# Patient Record
Sex: FEMALE | Race: WHITE | NOT HISPANIC OR LATINO | Employment: UNEMPLOYED | URBAN - METROPOLITAN AREA
[De-identification: names, ages, dates, MRNs, and addresses within clinical notes are randomized per-mention and may not be internally consistent; named-entity substitution may affect disease eponyms.]

---

## 2018-04-11 ENCOUNTER — OFFICE VISIT (OUTPATIENT)
Dept: INTERNAL MEDICINE | Facility: MEDICAL CENTER | Age: 33
End: 2018-04-11
Payer: MEDICAID

## 2018-04-11 VITALS
DIASTOLIC BLOOD PRESSURE: 60 MMHG | HEIGHT: 71 IN | SYSTOLIC BLOOD PRESSURE: 120 MMHG | HEART RATE: 76 BPM | BODY MASS INDEX: 29.99 KG/M2 | WEIGHT: 214.2 LBS | OXYGEN SATURATION: 94 % | TEMPERATURE: 97.9 F

## 2018-04-11 DIAGNOSIS — F34.1 DYSTHYMIA: ICD-10-CM

## 2018-04-11 DIAGNOSIS — R21 RASH: ICD-10-CM

## 2018-04-11 DIAGNOSIS — F90.9 ATTENTION DEFICIT HYPERACTIVITY DISORDER (ADHD), UNSPECIFIED ADHD TYPE: ICD-10-CM

## 2018-04-11 DIAGNOSIS — H61.20 IMPACTED CERUMEN, UNSPECIFIED LATERALITY: ICD-10-CM

## 2018-04-11 DIAGNOSIS — E78.5 DYSLIPIDEMIA: ICD-10-CM

## 2018-04-11 PROCEDURE — 99204 OFFICE O/P NEW MOD 45 MIN: CPT | Mod: GC | Performed by: INTERNAL MEDICINE

## 2018-04-11 ASSESSMENT — PATIENT HEALTH QUESTIONNAIRE - PHQ9
CLINICAL INTERPRETATION OF PHQ2 SCORE: 2
SUM OF ALL RESPONSES TO PHQ QUESTIONS 1-9: 9
5. POOR APPETITE OR OVEREATING: 1 - SEVERAL DAYS

## 2018-04-11 NOTE — PROGRESS NOTES
New Patient to Establish    Reason to establish: New patient to establish    CC:     Dysthymia  ADHD  Autism spectrum disorder  Dyslipidemia      HPI:   33 year old presented to establish care. Reports being diagnosed with ADHD and autism spectrum disorder at the age of 29 in [place]. Is currently requesting a referral to psychology and psychiatry for the same reason. She was also diagnosed with depression/dysthymia and currently reports low mood, sleep difficulties, difficulties with concentration, loss of interest, guilt. Denies any SI or HI. Reports a history of self-harm but has never made a plan to kill herself or others.  Patient also came in with a topical clotrimazole ointment which he applies around her nipple areas for a reported painful erythematous rash which has been persistent. She reports relief only with clotrimazole and other agents such as topical emollients and Vaseline to not help the condition. Denies any bleeding or discharge currently. She reports that her rash is well controlled currently. She is running out of her prescription and requesting a refill on the ointment.    Patient Active Problem List    Diagnosis Date Noted   • Dysthymia 04/11/2018   • ADHD 04/11/2018   • Dyslipidemia 04/11/2018   • Excess ear wax 04/11/2018       Past medical history:  History of dyslipidemia, dysthymia, ADHD, autism spectrum disorder.    Current Outpatient Prescriptions   Medication Sig Dispense Refill   • carbamide peroxide (CARBAMOXIDE EAR DROPS) 6.5 % Solution Place 6 Drops in ear 2 times a day. Administer drops in both ears. 1 Bottle 3     No current facility-administered medications for this visit.        Allergies as of 04/11/2018   • (Not on File)       Social History     Social History   • Marital status: Unknown     Spouse name: N/A   • Number of children: N/A   • Years of education: N/A     Occupational History   • Not on file.     Social History Main Topics   • Smoking status: Never Smoker   •  "Smokeless tobacco: Never Used   • Alcohol use Yes      Comment: rarely   • Drug use: No   • Sexual activity: Not on file     Other Topics Concern   • Not on file     Social History Narrative   • No narrative on file     Family history: History of depression in her aunt. History of hypertension in her parents.    History reviewed. No pertinent surgical history.    ROS: As per HPI. Additional pertinent symptoms as noted below.    Constitutional: denies unintentional weight loss or excessive fatigue  Eyes: denies blurry vision  ENT: reports sensation of buildup of earwax. Denies any difficulty hearing  Cardiovascular: denies any chest pain, palpitations, orthopnea  Respiratory: denies any cough, phlegm production, fevers  GI: denies any chronic constipation or diarrhea  : denies any dysuria  Musculo-skeletal: denies any pedal edema  Skin: as per history of present illness.  Neurological: denies any weakness or sensory disturbances  Psychological: as per history of present illness.  Other: not applicable  All others negative    /60   Pulse 76   Temp 36.6 °C (97.9 °F)   Ht 1.791 m (5' 10.5\")   Wt 97.2 kg (214 lb 3.2 oz)   SpO2 94%   BMI 30.30 kg/m²     Physical Exam  General:  Alert and oriented, No apparent distress.    Breast : Normal-appearing periareolar left skin. No active erythema, rash, discharge, tenderness noted.    Ears: Cerumen noted obstructing 25% of the lumen. Tympanic membranes can be visualized bilaterally.    Eyes: Pupils equal and reactive. No scleral icterus.    Throat: Clear no erythema or exudates noted.    Neck: Supple. No lymphadenopathy noted. Thyroid not enlarged.    Lungs: Clear to auscultation and percussion bilaterally.    Cardiovascular: Regular rate and rhythm. No murmurs, rubs or gallops.    Abdomen:  Benign. No rebound or guarding noted.    Extremities: No clubbing, cyanosis, edema.    Skin: Clear. No rash or suspicious skin lesions noted.      Note: I have reviewed all " pertinent labs and diagnostic tests associated with this visit with specific comments listed under the assessment and plan below    Assessment and Plan    1. Dysthymia  -Referred to behavioral health. Patient is requesting psychiatry assessment for medication management related to this disease.  -B12, folate, TSH      2. Attention deficit hyperactivity disorder (ADHD), unspecified ADHD type  -Referral to behavioral health.   -Patient is requesting psychology referral for ADHD    3. Dyslipidemia  - Patient reports a history of this.   - Lipid panel ordered    4. Impacted cerumen, unspecified laterality  -Patient recommended to apply Carbamide peroxide  - ENT referral    5. Rash  - self reported per-areolar rash. Patient was prescribed clotrimazole ointment however is currently breast-feeding  -Clotrimazole use is not studied in human subject who are pregnant or breastfeeding.   - recommend stopping use until further evaluation and counseling through dermatology  - referral to Dermatology given  - Okay to apply vaseline.       Followup: 5 weeks  Risk Assessment (discuss potential complications a function of chronic problems): N/A    Complexity (discuss number of co-morbidities): No complex    Signed by: Zacarias Calero M.D.

## 2018-04-16 DIAGNOSIS — E78.5 DYSLIPIDEMIA: ICD-10-CM

## 2018-04-16 DIAGNOSIS — F90.9 ATTENTION DEFICIT HYPERACTIVITY DISORDER (ADHD), UNSPECIFIED ADHD TYPE: ICD-10-CM

## 2018-04-16 DIAGNOSIS — F34.1 DYSTHYMIA: ICD-10-CM

## 2018-05-16 ENCOUNTER — OFFICE VISIT (OUTPATIENT)
Dept: INTERNAL MEDICINE | Facility: MEDICAL CENTER | Age: 33
End: 2018-05-16
Payer: MEDICAID

## 2018-05-16 VITALS
DIASTOLIC BLOOD PRESSURE: 62 MMHG | OXYGEN SATURATION: 96 % | HEART RATE: 80 BPM | HEIGHT: 71 IN | TEMPERATURE: 97 F | SYSTOLIC BLOOD PRESSURE: 110 MMHG | WEIGHT: 210.4 LBS | BODY MASS INDEX: 29.46 KG/M2

## 2018-05-16 DIAGNOSIS — H61.20 IMPACTED CERUMEN, UNSPECIFIED LATERALITY: ICD-10-CM

## 2018-05-16 DIAGNOSIS — R31.29 MICROHEMATURIA: ICD-10-CM

## 2018-05-16 DIAGNOSIS — F34.1 DYSTHYMIA: ICD-10-CM

## 2018-05-16 DIAGNOSIS — F90.9 ATTENTION DEFICIT HYPERACTIVITY DISORDER (ADHD), UNSPECIFIED ADHD TYPE: ICD-10-CM

## 2018-05-16 DIAGNOSIS — E78.5 DYSLIPIDEMIA: ICD-10-CM

## 2018-05-16 DIAGNOSIS — F84.0 AUTISM SPECTRUM DISORDER: ICD-10-CM

## 2018-05-16 PROCEDURE — 99214 OFFICE O/P EST MOD 30 MIN: CPT | Mod: GC | Performed by: INTERNAL MEDICINE

## 2018-05-16 RX ORDER — ASPIRIN 81 MG
TABLET, DELAYED RELEASE (ENTERIC COATED) ORAL
COMMUNITY
Start: 2018-04-11 | End: 2018-07-25

## 2018-05-16 NOTE — PROGRESS NOTES
Established Patient    Shakira presents today with the following:    CC: Follow-up for dysthymia, dyslipidemia, history of microhematuria    HPI: 33-year-old female with a past medical history of ADHD, autism spectrum disorder, dyslipidemia presented for follow-up visit to review referrals and labs. 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 She has problems with low mood, sleep difficulties, difficulty in concentration, loss of interest, and constant irritable mood. She denies any SI or HI. She does have a history of self-harm but denies any attempts. Her B12 and TSH are within normal limits. She is getting psychotherapy through psychology. And has upcoming psychiatry appointment to discuss potential medications for therapy.    Patient continues to apply topical clotrimazole ointment around her nipple area for painful erythematous rash after breast-feeding with resultant discharge. She was advised to stop as clotrimazole is partially absorbed and secreted in breast milk. The case was discussed with Dr. Rosenthal, dermatology, who recommends stopping clotrimazole in applying topical emollients along with penaten, homeopathic ointment aound the nipple area and wiping off before breast feeding.     Patient also reports a history of microhematuria which was diagnosed around a year ago. Workup including a CT scan was negative for any pathology. She reports having seen urology for the same reason. Currently denies any pink urine or gross hematuria. Denies any prior history of kidney stones, bladder stones, or masses.    Patient Active Problem List    Diagnosis Date Noted   • Microhematuria 05/16/2018   • Dysthymia 04/11/2018   • ADHD 04/11/2018   • Dyslipidemia 04/11/2018   • Excess ear wax 04/11/2018       Current Outpatient Prescriptions   Medication Sig Dispense Refill   • carbamide peroxide (CARBAMOXIDE EAR DROPS) 6.5  "% Solution Place 6 Drops in ear 2 times a day. Administer drops in both ears. 1 Bottle 3   • DEBROX 6.5 % Solution        No current facility-administered medications for this visit.        ROS: As per HPI. Additional pertinent symptoms as noted below.    Constitutional: denies any unintentional weight loss or excessive fatigue  Cardiovascular: denies any chest palpitations  Respiratory: denies any cough or phlegm production  Musculo-skeletal: denies any pedal edema  Psychological: as per history of present illness  All others negative    History reviewed. No pertinent family history.  Social History   Substance Use Topics   • Smoking status: Never Smoker   • Smokeless tobacco: Never Used   • Alcohol use Yes      Comment: rarely     History reviewed. No pertinent surgical history.  Allergies: Patient has no allergy information on record.    /62   Pulse 80   Temp 36.1 °C (97 °F)   Ht 1.791 m (5' 10.5\")   Wt 95.4 kg (210 lb 6.4 oz)   SpO2 96%   BMI 29.76 kg/m²     Physical Exam   Constitutional:  oriented to person, place, and time. No distress.   Eyes: Pupils are equal, round, and reactive to light. No scleral icterus.  Neck: Neck supple. No thyromegaly present.   Cardiovascular: Normal rate, regular rhythm and normal heart sounds.  Exam reveals no gallop and no friction rub.  No murmur heard.  Pulmonary/Chest: Breath sounds normal. Chest wall is not dull to percussion.   Musculoskeletal:   no edema.   Lymphadenopathy: no cervical adenopathy  Neurological: alert and oriented to person, place, and time.   Skin: No cyanosis. Nails show no clubbing.        Assessment and Plan    1. Microhematuria  - reports a history of this and getting CT imaging which was negative 2 years ago.   - no records available.  - Was recommended yearly UA for monitoring. Ordered.        2. Dysthymia  -Referred to behavioral health is currently receiving psychotherapy. She is also scheduled to have psychiatry to initial evaluation " for depression on 5/17/18.  -Reports stable low mood symptoms. Please see history of present illness for more details  -Denies any SI or HI  -Patient is concerned about starting new medications as she is currently breastfeeding.   -she does have a history of self harm.      2. Attention deficit hyperactivity disorder (ADHD), unspecified ADHD type      Autism spectrum disorder  Patient is concerned about having an organic cause of this as she was diagnosed in late 20s. She requests to have a SPECT scan of the brain for these concerns.  - Denies a prior history of TBI, mass, seizures, skull abnormalities, encephalitis, meningitis.   - Recommend psychiatric evaluation to determine whether this test would be required.        3. Dyslipidemia  - Patient reports a history of this.   - Lipid panel shows TC of 166 and LDL high at 101.   - Patient educated importance of good LDL control and importance of CV risk control.   - Recommended lifestyle modification including increased exercise, diet control, low cholesterol diet, and weight loss.   - Repeat lipid panel in 5/2019     4. Impacted cerumen, unspecified laterality  -Patient recommended to apply Carbamide peroxide  - ENT referral pending     5. Rash  - self reported per-areolar rash. Patient was prescribed clotrimazole ointment however is currently breast-feeding  -Clotrimazole use is not studied in human subject who are pregnant or breastfeeding. Class C pregnancyu and is excreted in breast milk.   - Dr. Rosenthal recommend stopping use and applying Pentamen cream.   - referral to Dermatology given pending.   - Okay to apply vaseline.        TDAP: Will address on next visit  COLONOSCOPY: Not indicated  FLU SHOT: We'll administer at next visit  PPV/PCV:  Not indicated  MAMMOGRAM: Not indicated  Pap smear: Will address at next visit.         Return in about 3 months (around 8/16/2018).   Signed by: Zacarias Calero M.D.

## 2018-05-16 NOTE — PATIENT INSTRUCTIONS
Dyslipidemia  Dyslipidemia is an imbalance of waxy, fat-like substances (lipids) in the blood. The body needs lipids in small amounts. Dyslipidemia often involves a high level of cholesterol or triglycerides, which are types of lipids.  Common forms of dyslipidemia include:  · High levels of bad cholesterol (LDL cholesterol). LDL is the type of cholesterol that causes fatty deposits (plaques) to build up in the blood vessels that carry blood away from your heart (arteries).  · Low levels of good cholesterol (HDL cholesterol). HDL cholesterol is the type of cholesterol that protects against heart disease. High levels of HDL remove the LDL buildup from arteries.  · High levels of triglycerides. Triglycerides are a fatty substance in the blood that is linked to a buildup of plaques in the arteries.  You can develop dyslipidemia because of the genes you are born with (primary dyslipidemia) or changes that occur during your life (secondary dyslipidemia), or as a side effect of certain medical treatments.  What are the causes?  Primary dyslipidemia is caused by changes (mutations) in genes that are passed down through families (inherited). These mutations cause several types of dyslipidemia. Mutations can result in disorders that make the body produce too much LDL cholesterol or triglycerides, or not enough HDL cholesterol. These disorders may lead to heart disease, arterial disease, or stroke at an early age.  Causes of secondary dyslipidemia include certain lifestyle choices and diseases that lead to dyslipidemia, such as:  · Eating a diet that is high in animal fat.  · Not getting enough activity or exercise (having a sedentary lifestyle).  · Having diabetes, kidney disease, liver disease, or thyroid disease.  · Drinking large amounts of alcohol.  · Using certain types of drugs.  What increases the risk?  You may be at greater risk for dyslipidemia if you are an older man or if you are a woman who has gone through  menopause. Other risk factors include:  · Having a family history of dyslipidemia.  · Taking certain medicines, including birth control pills, steroids, some diuretics, beta-blockers, and some medicines for HIV.  · Smoking cigarettes.  · Eating a high-fat diet.  · Drinking large amounts of alcohol.  · Having certain medical conditions such as diabetes, polycystic ovary syndrome (PCOS), pregnancy, kidney disease, liver disease, or hypothyroidism.  · Not exercising regularly.  · Being overweight or obese with too much belly fat.  What are the signs or symptoms?  Dyslipidemia does not usually cause any symptoms.  Very high lipid levels can cause fatty bumps under the skin (xanthomas) or a white or gray ring around the black center (pupil) of the eye. Very high triglyceride levels can cause inflammation of the pancreas (pancreatitis).  How is this diagnosed?  Your health care provider may diagnose dyslipidemia based on a routine blood test (fasting blood test). Because most people do not have symptoms of the condition, this blood testing (lipid profile) is done on adults age 20 and older and is repeated every 5 years. This test checks:  · Total cholesterol. This is a measure of the total amount of cholesterol in your blood, including LDL cholesterol, HDL cholesterol, and triglycerides. A healthy number is below 200.  · LDL cholesterol. The target number for LDL cholesterol is different for each person, depending on individual risk factors. For most people, a number below 100 is healthy. Ask your health care provider what your LDL cholesterol number should be.  · HDL cholesterol. An HDL level of 60 or higher is best because it helps to protect against heart disease. A number below 40 for men or below 50 for women increases the risk for heart disease.  · Triglycerides. A healthy triglyceride number is below 150.  If your lipid profile is abnormal, your health care provider may do other blood tests to get more information  about your condition.  How is this treated?  Treatment depends on the type of dyslipidemia that you have and your other risk factors for heart disease and stroke. Your health care provider will have a target range for your lipid levels based on this information.  For many people, treatment starts with lifestyle changes, such as diet and exercise. Your health care provider may recommend that you:  · Get regular exercise.  · Make changes to your diet.  · Quit smoking if you smoke.  If diet changes and exercise do not help you reach your goals, your health care provider may also prescribe medicine to lower lipids. The most commonly prescribed type of medicine lowers your LDL cholesterol (statin drug). If you have a high triglyceride level, your provider may prescribe another type of drug (fibrate) or an omega-3 fish oil supplement, or both.  Follow these instructions at home:  · Take over-the-counter and prescription medicines only as told by your health care provider. This includes supplements.  · Get regular exercise. Start an aerobic exercise and strength training program as told by your health care provider. Ask your health care provider what activities are safe for you. Your health care provider may recommend:  ¨ 30 minutes of aerobic activity 4-6 days a week. Brisk walking is an example of aerobic activity.  ¨ Strength training 2 days a week.  · Eat a healthy diet as told by your health care provider. This can help you reach and maintain a healthy weight, lower your LDL cholesterol, and raise your HDL cholesterol. It may help to work with a diet and nutrition specialist (dietitian) to make a plan that is right for you. Your dietitian or health care provider may recommend:  ¨ Limiting your calories, if you are overweight.  ¨ Eating more fruits, vegetables, whole grains, fish, and lean meats.  ¨ Limiting saturated fat, trans fat, and cholesterol.  · Follow instructions from your health care provider or dietitian  about eating or drinking restrictions.  · Limit alcohol intake to no more than one drink per day for nonpregnant women and two drinks per day for men. One drink equals 12 oz of beer, 5 oz of wine, or 1½ oz of hard liquor.  · Do not use any products that contain nicotine or tobacco, such as cigarettes and e-cigarettes. If you need help quitting, ask your health care provider.  · Keep all follow-up visits as told by your health care provider. This is important.  Contact a health care provider if:  · You are having trouble sticking to your exercise or diet plan.  · You are struggling to quit smoking or control your use of alcohol.  Summary  · Dyslipidemia is an imbalance of waxy, fat-like substances (lipids) in the blood. The body needs lipids in small amounts. Dyslipidemia often involves a high level of cholesterol or triglycerides, which are types of lipids.  · Treatment depends on the type of dyslipidemia that you have and your other risk factors for heart disease and stroke.  · For many people, treatment starts with lifestyle changes, such as diet and exercise. Your health care provider may also prescribe medicine to lower lipids.  This information is not intended to replace advice given to you by your health care provider. Make sure you discuss any questions you have with your health care provider.  Document Released: 12/23/2014 Document Revised: 08/14/2017 Document Reviewed: 08/14/2017  [employer] Interactive Patient Education © 2017 [employer]

## 2018-05-31 DIAGNOSIS — R31.29 MICROHEMATURIA: ICD-10-CM

## 2018-07-10 ENCOUNTER — OFFICE VISIT (OUTPATIENT)
Dept: INTERNAL MEDICINE | Facility: MEDICAL CENTER | Age: 33
End: 2018-07-10
Payer: MEDICAID

## 2018-07-10 VITALS — WEIGHT: 216.2 LBS | HEIGHT: 71 IN | BODY MASS INDEX: 30.27 KG/M2

## 2018-07-10 DIAGNOSIS — O92.29 PAIN OF BREAST DURING BREASTFEEDING: ICD-10-CM

## 2018-07-10 DIAGNOSIS — R21 RASH: ICD-10-CM

## 2018-07-10 PROBLEM — R20.9 DISTURBANCE OF SKIN SENSATION: Status: ACTIVE | Noted: 2018-07-10

## 2018-07-10 PROCEDURE — 99202 OFFICE O/P NEW SF 15 MIN: CPT | Performed by: DERMATOLOGY

## 2018-07-10 RX ORDER — CLOTRIMAZOLE 1 %
CREAM (GRAM) TOPICAL
Qty: 1 TUBE | Refills: 0 | Status: SHIPPED | OUTPATIENT
Start: 2018-07-10 | End: 2019-06-05

## 2018-07-10 NOTE — PATIENT INSTRUCTIONS
Can try vaseline or aquaphor to see if moisturizer helps.  Can try to air out breasts by being home with no shirt.   Can try breast milk on nipple after nursing  Can try changing position when nursing

## 2018-07-10 NOTE — PROGRESS NOTES
"Shakira Pete  1985  [id number]    Consultation             Vitals:    07/10/18 1100   Weight: 98.1 kg (216 lb 3.2 oz)   Height: 1.791 m (5' 10.5\")       Chief Complaint   Patient presents with   • Rash     Breast      ___________________________________________________________________            History of Present Illness (HPI) c/o rash on breasts x several months.   Using clotrimazole with sometimes help. Similar rash when breast feeding   About 2 years ago. Now with 17 month old. Still nursing.         Dr. Calero has referred for a consultation to evaluate rash    Current Outpatient Prescriptions on File Prior to Visit   Medication Sig Dispense Refill   • DEBROX 6.5 % Solution      • carbamide peroxide (CARBAMOXIDE EAR DROPS) 6.5 % Solution Place 6 Drops in ear 2 times a day. Administer drops in both ears. (Patient not taking: Reported on 7/10/2018) 1 Bottle 3     No current facility-administered medications on file prior to visit.      Patient has no allergy information on record.      Review of Systems:        General: Denies fever      Hematologic: no excessive bleeding problems              History reviewed. No pertinent past medical history.  Skin Cancer no h/o    Social History   Substance Use Topics   • Smoking status: Never Smoker   • Smokeless tobacco: Never Used   • Alcohol use Yes      Comment: rarely     Sunscreen Use:No     History reviewed. No pertinent surgical history.        History reviewed. No pertinent family history.      Physical Exam:   Constitutional: Well nourished, NAD, pleasant  alert and cooperative; normal mood and affect; normal attention span and concentration.    Skin face exam done: no suspicious lesions on eyelids, lips, face, ears, except as noted.  Face few brown macs  R areola with mild inflammation, L areola with no inflammation                        Assessment and Plan:   1. Pain of breast during breastfeeding  Explained baby is preferring to latch on R side so " more soreness of R side.   Can try vaseline or aquaphor to see if moisturizer helps.  Can try to air out breasts by being home with no shirt.   Can try breast milk on nipple after nursing  Can try changing position when nursing  Refilled clotrimazole. Pt to wipe off before nursing.     2. Rash  Explained due to irritation from nursing.       Hue Rosenthal M.D.   Return if symptoms worsen or fail to improve.

## 2018-07-25 ENCOUNTER — OFFICE VISIT (OUTPATIENT)
Dept: INTERNAL MEDICINE | Facility: MEDICAL CENTER | Age: 33
End: 2018-07-25
Payer: MEDICAID

## 2018-07-25 VITALS
DIASTOLIC BLOOD PRESSURE: 68 MMHG | SYSTOLIC BLOOD PRESSURE: 99 MMHG | WEIGHT: 215 LBS | RESPIRATION RATE: 18 BRPM | BODY MASS INDEX: 30.1 KG/M2 | HEART RATE: 61 BPM | HEIGHT: 71 IN | TEMPERATURE: 96.7 F | OXYGEN SATURATION: 96 %

## 2018-07-25 DIAGNOSIS — R21 RASH: ICD-10-CM

## 2018-07-25 DIAGNOSIS — F34.1 DYSTHYMIA: ICD-10-CM

## 2018-07-25 DIAGNOSIS — E78.5 DYSLIPIDEMIA: ICD-10-CM

## 2018-07-25 DIAGNOSIS — R31.29 MICROHEMATURIA: ICD-10-CM

## 2018-07-25 PROCEDURE — 99214 OFFICE O/P EST MOD 30 MIN: CPT | Mod: GC | Performed by: INTERNAL MEDICINE

## 2018-07-25 NOTE — PROGRESS NOTES
Established Patient    Shakira presents today with the following:    CC:   Microhematuria        HPI:   33-year-old with a past medical history of ADHD, autism spectrum disorder, dyslipidemia presented for follow-up visit to review labs. She had a urinalysis performed recently for history of hematuria. Currently denies any obvious hematuria, cloudy urine, flank pain, suprapubic tenderness, cloudy urine, followed her, or dysuria.    Patient was evaluated by dermatology who recommends continuing conservative therapies for her painful erythematous rash around her right nipple area. She continues to use clotrimazole as needed.    She was evaluated by mental health and was recommended neuropsychiatry testing. Her insurance does not cover this therefore, she was referred to psychiatry. Currently denies any SI/HI. 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    Patient Active Problem List    Diagnosis Date Noted   • Disturbance of skin sensation 07/10/2018   • Pain of breast during breastfeeding 07/10/2018   • Rash 07/10/2018   • Microhematuria 05/16/2018   • Autism spectrum disorder 05/16/2018   • Dysthymia 04/11/2018   • ADHD 04/11/2018   • Dyslipidemia 04/11/2018   • Excess ear wax 04/11/2018       Current Outpatient Prescriptions   Medication Sig Dispense Refill   • clotrimazole (LOTRIMIN) 1 % Cream Apply to affected area twice a day, antifungal 1 Tube 0     No current facility-administered medications for this visit.        ROS: As per HPI. Additional pertinent symptoms as noted below.  Constitutional: denies any unintentional weight loss or excessive fatigue  Cardiovascular: denies any chest palpitations  Respiratory: denies any cough or phlegm production  Musculo-skeletal: denies any pedal edema  Psychological: as per history of present illness  All others negative    No family history on file.  Social History   Substance Use Topics   • Smoking status: Never Smoker   • Smokeless tobacco:  "Never Used   • Alcohol use Yes      Comment: rarely     No past surgical history on file.  Allergies: Patient has no known allergies.    BP (!) 99/68   Pulse 61   Temp 35.9 °C (96.7 °F)   Resp 18   Ht 1.791 m (5' 10.5\")   Wt 97.5 kg (215 lb)   SpO2 96%   BMI 30.41 kg/m²     Physical Exam   Constitutional:  oriented to person, place, and time. No distress.   Eyes: Pupils are equal, round, and reactive to light. No scleral icterus.  Neck: Neck supple. No thyromegaly present.   Cardiovascular: Normal rate, regular rhythm and normal heart sounds.  Exam reveals no gallop and no friction rub.  No murmur heard.  Pulmonary/Chest: Breath sounds normal. Chest wall is not dull to percussion.   Musculoskeletal:   no edema.   Lymphadenopathy: no cervical adenopathy  Neurological: alert and oriented to person, place, and time.   Skin: No cyanosis. Nails show no clubbing.      Assessment and Plan    1. Microhematuria  - reports a history of this and getting CT imaging which was negative 2 years ago.   - no records available.  - UA shows occult blood +1+ without any RBCs.  -Repeat UA.    2. Rash  - self reported per-areolar rash. .   - Dr. Rosenthal recommend emollients.   - Okay to apply vaseline.   - prn clotrimazole    3. Dysthymia  -Was evaluated by mental health. Psychiatry referral placed in pending.  -Patient stopped sertraline use while breast-feeding. She does not want to use any SSRIs while breast-feeding.    4. Dyslipidemia  - Patient reports a history of this.   - Lipid panel shows TC of 166 and LDL high at 101.   - Patient educated importance of good LDL control and importance of CV risk control.   - Recommended lifestyle modification including increased exercise, diet control, low cholesterol diet, and weight loss.   - Repeat lipid panel in 5/2019    Return in about 6 months (around 1/25/2019).   Signed by: Zacarias Calero M.D.  "

## 2018-08-01 DIAGNOSIS — R31.29 MICROHEMATURIA: ICD-10-CM

## 2018-09-13 ENCOUNTER — TELEPHONE (OUTPATIENT)
Dept: INTERNAL MEDICINE | Facility: MEDICAL CENTER | Age: 33
End: 2018-09-13

## 2018-09-13 DIAGNOSIS — R41.89 AKINETIC MUTISM: ICD-10-CM

## 2018-09-13 NOTE — TELEPHONE ENCOUNTER
1. Caller Name: Tiffanie (NONAN RN)                      Call Back Number: [phone]    2. Message: Tiffanie called stating she is working with a patient of our to get a referral processed for her.    An hour later of receiving message from Tiffanie received a message from patient stating she needs a referral for neuropsychology and has a  working on this as well and would like a call back.    3. Patient approves office to leave a detailed voicemail/MyChart message: N\A    I called patient back today but was unable to speak with her. Will attempt to call tomorrow

## 2018-09-18 NOTE — TELEPHONE ENCOUNTER
Called Tiffanie after speaking with Shakira stating I need a little more information and diagnosis as ot why this referral is being made. I left a message for her to return my call

## 2018-09-20 NOTE — TELEPHONE ENCOUNTER
Got a call back from Tiffanie and spoke with her. She let me know patient would need a referral for neuropysch due to Dr. Beltran not being able to help this patient any more. She let me know that referral would need to be processed with Dx: R41.89 to Dr. Elina Cuadra P: [phone] F: [phone] Attn :Maria Del Carmen. On the universal auth form it would need to be authorized with CPT 96118 x 13 units. Auth form attn: Tiffanie RAWLS case manager. Patient needs this processed asap as patient got appt scheduled on 10/18 at 8:45 AM with travel arranged by medicaid

## 2018-09-24 NOTE — TELEPHONE ENCOUNTER
Zacarias Calero M.D.   Gregoria Brannon, Med Ass't Yesterday (3:25 PM)      Referral sent with details outlined (Routing comment

## 2018-10-03 ENCOUNTER — TELEPHONE (OUTPATIENT)
Dept: INTERNAL MEDICINE | Facility: MEDICAL CENTER | Age: 33
End: 2018-10-03

## 2018-10-03 DIAGNOSIS — F84.0 AUTISM SPECTRUM DISORDER: ICD-10-CM

## 2018-10-03 DIAGNOSIS — F90.9 ATTENTION DEFICIT HYPERACTIVITY DISORDER (ADHD), UNSPECIFIED ADHD TYPE: ICD-10-CM

## 2018-10-03 NOTE — TELEPHONE ENCOUNTER
----- Message from Dave Godoy sent at 10/3/2018 11:28 AM PDT -----  Karlie Kinney,    I Called Tiffanie (RN Case Manager [phone]) to get some clarification on this referral. Per Tiffanie, this referral is suppose to be for neuropsychology, not psychiatry. The patient has been scheduled with a neuropsychologist in [place], Dr. Elina Cuadra, and their office needs the order and medical records faxed to them. Also, [facility] requires that I fax this information to them to fax number [phone]. I will request a new referral to psychology and fax all appropriate information to the designated offices.       Could you have Dr. Calero place a referral similar to this one, but for psychology instead of psychiatry. I will take care of the rest after that.    Thank you  Dave  [facility]  ----- Message -----  From: Gregoria Brannon Med Ass't  Sent: 10/3/2018  11:03 AM  To: Dave Godoy    That would be great because she called again. Her number is [phone].    ----- Message -----  From: Dave Godoy  Sent: 10/3/2018  10:58 AM  To: Gregoria Brannon Med Ass't    Morning Gregoria,    Do you have a phone number for Tiffanie? For behavioral health referrals, if the patient has a [employer] [facility] insurance then we have to fax the referral to [employer] [place] and they tell the patient which offices the patient can go to. We do not directly fax referrals to offices. I can explain this to Tiffanie if I can talk to her.      Thank you  Dave  [facility]  ===View-only below this line===    ----- Message -----  From: Gregoria Brannon, Med Ass't  Sent: 10/2/2018   2:34 PM  To: Dave Acosta,  I received a call for this patient from Tiffanie the nurse that is working on this patient's referral for pysch states she hasn't received anything on her end.    Thank you        ----- Message -----  From: Gregoria Brannon, Med Ass't  Sent: 10/2/2018   2:34 PM  To: Dave Acosta,  I received a call for this patient from [facility] nurse  that is working on this patient's referral for henry states she hasn't received anything on her end.    Thank you

## 2018-10-04 ENCOUNTER — TELEPHONE (OUTPATIENT)
Dept: INTERNAL MEDICINE | Facility: MEDICAL CENTER | Age: 33
End: 2018-10-04

## 2018-10-04 DIAGNOSIS — F84.0 AUTISM SPECTRUM DISORDER: ICD-10-CM

## 2018-10-04 DIAGNOSIS — F90.9 ATTENTION DEFICIT HYPERACTIVITY DISORDER (ADHD), UNSPECIFIED ADHD TYPE: ICD-10-CM

## 2018-11-09 ENCOUNTER — OFFICE VISIT (OUTPATIENT)
Dept: INTERNAL MEDICINE | Facility: MEDICAL CENTER | Age: 33
End: 2018-11-09
Payer: MEDICAID

## 2018-11-09 VITALS
TEMPERATURE: 97.6 F | HEART RATE: 64 BPM | WEIGHT: 215.8 LBS | SYSTOLIC BLOOD PRESSURE: 91 MMHG | HEIGHT: 71 IN | OXYGEN SATURATION: 95 % | BODY MASS INDEX: 30.21 KG/M2 | DIASTOLIC BLOOD PRESSURE: 59 MMHG

## 2018-11-09 DIAGNOSIS — B07.0 PLANTAR WARTS: ICD-10-CM

## 2018-11-09 DIAGNOSIS — F32.9 MAJOR DEPRESSIVE DISORDER WITH CURRENT ACTIVE EPISODE, UNSPECIFIED DEPRESSION EPISODE SEVERITY, UNSPECIFIED WHETHER RECURRENT: ICD-10-CM

## 2018-11-09 DIAGNOSIS — F90.9 ATTENTION DEFICIT HYPERACTIVITY DISORDER (ADHD), UNSPECIFIED ADHD TYPE: ICD-10-CM

## 2018-11-09 DIAGNOSIS — F60.9 PERSONALITY DISORDER IN ADULT (HCC): ICD-10-CM

## 2018-11-09 DIAGNOSIS — F41.1 GENERALIZED ANXIETY DISORDER: ICD-10-CM

## 2018-11-09 DIAGNOSIS — F40.10 SOCIAL ANXIETY DISORDER: ICD-10-CM

## 2018-11-09 PROBLEM — F84.0 AUTISM SPECTRUM DISORDER: Status: RESOLVED | Noted: 2018-05-16 | Resolved: 2018-11-09

## 2018-11-09 PROCEDURE — 99213 OFFICE O/P EST LOW 20 MIN: CPT | Mod: GE | Performed by: INTERNAL MEDICINE

## 2018-11-09 ASSESSMENT — PAIN SCALES - GENERAL: PAINLEVEL: NO PAIN

## 2018-11-09 NOTE — PROGRESS NOTES
Established Patient    Shakira presents today with the following:    CC:   Depression  Plantar Warts      HPI:   33-year-old patient with a past medical history of depression presented to the clinic for follow-up care.  Her recent neuropsychological evaluation to clarify current cognitive functioning was performed in [place].  Ms. Pete believed that she was on the autism spectrum this was diagnosed in [place].  To clarify her diagnosis she requested neuropsychiatry evaluation and renal however no providers are available in [facility] to assist with this diagnosis beyond the age of 21.  She was evaluated by Dr. Yin Cuadra, clinical neuropsychologist and [place].  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  She currently reports her mood as low with her prior symptoms of anhedonia, irritability, low confidence, and generalized anxiety.  Denies any suicidal ideation.      Patient Active Problem List    Diagnosis Date Noted   • Generalized anxiety disorder 11/09/2018   • Social anxiety disorder 11/09/2018   • Personality disorder in adult (HCC) 11/09/2018   • Disturbance of skin sensation 07/10/2018   • Pain of breast during breastfeeding 07/10/2018   • Rash 07/10/2018   • Microhematuria 05/16/2018   • Major depressive disorder 05/16/2018   • Dysthymia 04/11/2018   • ADHD 04/11/2018   • Dyslipidemia 04/11/2018   • Excess ear wax 04/11/2018       Current Outpatient Prescriptions   Medication Sig Dispense Refill   • clotrimazole (LOTRIMIN) 1 % Cream Apply to affected area twice a day, antifungal 1 Tube 0     No current facility-administered medications for this visit.        ROS: As per HPI. Additional pertinent symptoms as noted below.    All others reviewed and negative    No family history on  "file.  Social History   Substance Use Topics   • Smoking status: Never Smoker   • Smokeless tobacco: Never Used   • Alcohol use Yes      Comment: rarely     No past surgical history on file.  Allergies: Patient has no known allergies.    BP (!) 91/59 (BP Location: Left arm, Patient Position: Sitting)   Pulse 64   Temp 36.4 °C (97.6 °F) (Temporal)   Ht 1.791 m (5' 10.5\")   Wt 97.9 kg (215 lb 12.8 oz)   SpO2 95%   BMI 30.53 kg/m²    BP confirmed with Manual cuff. 108/92 on manual examination       Physical Exam   Constitutional:  oriented to person, place, and time. No distress.   Eyes: Pupils are equal, round, and reactive to light. No scleral icterus.  Neck: Neck supple. No thyromegaly present.   Cardiovascular: Normal rate, regular rhythm and normal heart sounds.  Exam reveals no gallop and no friction rub.  No murmur heard.  Pulmonary/Chest: Breath sounds normal. Chest wall is not dull to percussion.   Musculoskeletal:   no edema.   Lymphadenopathy: no cervical adenopathy  Neurological: alert and oriented to person, place, and time.   Skin: Left heel plantar wart noted         Assessment and Plan    1. Generalized anxiety disorder  2. Major depressive disorder with current active episode, unspecified depression episode severity, unspecified whether recurrent  3. Social anxiety disorder  4. Attention deficit hyperactivity disorder (ADHD), unspecified ADHD type  5. Personality disorder in adult (HCC)  -Patient was incorrectly diagnosed to be on the autism spectrum however  recent neuropsych evaluation was completed  -Neurology consult for neurological evaluation recommended to rule out contributions into her current cognitive functioning.  -Psychiatry referral for psychiatry intervention given her severe major depressive disorder, recurrent.  -Continue psychotherapy utilizing CBT  -Speech therapy for cognitive compensatory strategies targeting cognitive weaknesses     5. Dyslipidemia      Obesity  - Patient " reports a history of this.   - Lipid panel shows TC of 166 and LDL high at 101.   - Patient educated importance of good LDL control and importance of CV risk control.   - Recommended lifestyle modification including increased exercise, diet control, low cholesterol diet, and weight loss. Has been noncompliant with this recently. Stable weight.   - Repeat lipid panel in 5/2019    6. Plantar Warts  - dermatology follow up recommended    Return in about 10 weeks (around 1/18/2019).   Signed by: Zacarias Calero M.D.

## 2018-11-16 ENCOUNTER — TELEPHONE (OUTPATIENT)
Dept: INTERNAL MEDICINE | Facility: MEDICAL CENTER | Age: 33
End: 2018-11-16

## 2018-11-16 DIAGNOSIS — Z12.4 ROUTINE PAPANICOLAOU SMEAR: ICD-10-CM

## 2018-11-16 DIAGNOSIS — R87.619 ABNORMAL CERVICAL PAPANICOLAOU SMEAR, UNSPECIFIED ABNORMAL PAP FINDING: ICD-10-CM

## 2018-11-16 NOTE — TELEPHONE ENCOUNTER
Patient called asking for a referral to an OBGYN as she is due for her routine pap smear. I let her know I would place this message in and would pass it over to Dr. Calero once referral placed I would give her a call letting her know it was put in

## 2018-12-20 ENCOUNTER — HOSPITAL ENCOUNTER (OUTPATIENT)
Dept: LAB | Facility: MEDICAL CENTER | Age: 33
End: 2018-12-20
Attending: SPECIALIST
Payer: MEDICAID

## 2018-12-20 PROCEDURE — 88175 CYTOPATH C/V AUTO FLUID REDO: CPT

## 2018-12-20 PROCEDURE — 87491 CHLMYD TRACH DNA AMP PROBE: CPT

## 2018-12-20 PROCEDURE — 87591 N.GONORRHOEAE DNA AMP PROB: CPT

## 2018-12-20 PROCEDURE — 87624 HPV HI-RISK TYP POOLED RSLT: CPT

## 2018-12-24 LAB — AMBIGUOUS DTTM AMBI4: NORMAL

## 2018-12-26 LAB
C TRACH DNA GENITAL QL NAA+PROBE: NEGATIVE
CYTOLOGY REG CYTOL: NORMAL
HPV HR 12 DNA CVX QL NAA+PROBE: NEGATIVE
HPV16 DNA SPEC QL NAA+PROBE: NEGATIVE
HPV18 DNA SPEC QL NAA+PROBE: NEGATIVE
N GONORRHOEA DNA GENITAL QL NAA+PROBE: NEGATIVE
SPECIMEN SOURCE: NORMAL
SPECIMEN SOURCE: NORMAL

## 2019-01-09 ENCOUNTER — OFFICE VISIT (OUTPATIENT)
Dept: NEUROLOGY | Facility: MEDICAL CENTER | Age: 34
End: 2019-01-09
Payer: MEDICAID

## 2019-01-09 VITALS
DIASTOLIC BLOOD PRESSURE: 72 MMHG | SYSTOLIC BLOOD PRESSURE: 94 MMHG | OXYGEN SATURATION: 94 % | HEIGHT: 71 IN | WEIGHT: 218.26 LBS | BODY MASS INDEX: 30.56 KG/M2 | TEMPERATURE: 97.6 F | HEART RATE: 56 BPM

## 2019-01-09 DIAGNOSIS — R41.3 MEMORY CHANGES: ICD-10-CM

## 2019-01-09 DIAGNOSIS — R41.840 INATTENTION: ICD-10-CM

## 2019-01-09 PROCEDURE — 99205 OFFICE O/P NEW HI 60 MIN: CPT | Performed by: PSYCHIATRY & NEUROLOGY

## 2019-01-09 NOTE — PROGRESS NOTES
CC: Cognitive Impairment      HPI:    Shakira Pete is a 33 y.o. female who presents today in initial neurologic consultation for cognitive/memory impairment. The patient was referred by their primary care provider Zacarias Calero M.D.    Ms. Pete recalls being quite forgetful all throughout her upbringing, but this seems increased in recent years. Ms. Pete had a neuropsychological evaluation performed on October 4th, 2018, by Dr. iYn Cuadra in [place] and it was recommended that she have a neurological evaluation to rule out contributions to her memory complaints. She was diagnosed with major depressive disorder, generalized anxiety disorder, social anxiety, ADHD (inattentive subtype) and personality disorder with avoidant features.     The memory issues have been affecting her life. She gives an example of traveling becoming more stressful because she is afraid she is going to lose or forget something. She lived in [place] for several years with her  who had a job there. Her second child is still nursing.     Associated Symptoms:   Hallucinations: No  Tremors: No  Sleep disturbance: No  Seizures: No  Language problems: No  Personality changes: No      ROS:   Constitutional: No fevers or chills.  Eyes: No blurry vision or eye pain.  ENT: No dysphagia or hearing loss.  Respiratory: No cough or shortness of breath.  Cardiovascular: No chest pain or palpitations.  GI: No nausea, vomiting, or diarrhea.  : No urinary incontinence or dysuria.  Musculoskeletal: No joint swelling or arthralgias.  Skin: No skin rashes.  Neuro: No headaches, dizziness, or tremors.  Endocrine: No heat or cold intolerance. No polydipsia or polyuria.  Psych: + depression + anxiety. Denies SI.  Heme/Lymph: No easy bruising or swollen lymph nodes.  All other review of systems were reviewed and were negative.     Past Medical History:   Past Medical History:   Diagnosis Date   • ADHD    • Anxiety    • Migraine        Past Surgical  "History: History reviewed. No pertinent surgical history.    Social History:   Social History     Social History   • Marital status: Unknown     Spouse name: N/A   • Number of children: N/A   • Years of education: N/A     Occupational History   • Not on file.     Social History Main Topics   • Smoking status: Never Smoker   • Smokeless tobacco: Never Used   • Alcohol use Yes      Comment: rarely   • Drug use: No   • Sexual activity: Not on file     Other Topics Concern   • Not on file     Social History Narrative   • No narrative on file       Family Hx:   Family History   Problem Relation Age of Onset   • Depression Mother    • Depression Sister    • Other Daughter         chronic constipation, fecal impaction as an infant   • No Known Problems Daughter        Current Medications:   Current Outpatient Prescriptions:   •  clotrimazole (LOTRIMIN) 1 % Cream, Apply to affected area twice a day, antifungal, Disp: 1 Tube, Rfl: 0    Allergies: No Known Allergies      Physical Exam:   Ambulatory Vitals  Vitals  Blood Pressure: (!) 94/72  Temperature: 36.4 °C (97.6 °F)  Temp src: Temporal  Pulse: (!) 56  Pulse Oximetry: 94 %  Height: 179.1 cm (5' 10.5\")  Weight: 99 kg (218 lb 4.1 oz)  Encounter Vitals  Temperature: 36.4 °C (97.6 °F)  Temp src: Temporal  Blood Pressure: (!) 94/72  Pulse: (!) 56  Pulse Oximetry: 94 %  Weight: 99 kg (218 lb 4.1 oz)  Height: 179.1 cm (5' 10.5\")  BMI (Calculated): 30.87      Constitutional: Well-developed, well-nourished, good hygiene. Appears stated age.  Cardiovascular: RRR, with no murmurs, rubs or gallops. No carotid bruits. No peripheral edema, pedal pulses intact.   Respiratory: Lungs CTA B/L, no W/R/R.   Skin: Warm, dry, intact. No rashes observed.  Eyes:    Funduscopic: Optic discs flat with no evidence of papilledema or pallor. Normal posterior segments.  Neurologic:   Mental Status: Awake, alert, oriented x 3.   Speech: Fluent with normal prosody.   Memory: Able to recall 3 words at 1 " minute and 5 minutes. Able to recall recent and remote events accurately.    Concentration: Attentive. Able to focus on history and follow multi-step commands.   Fund of Knowledge: Appropriate.   Cranial Nerves:    CN II: PERRL. No afferent pupillary defect.    CN III, IV, VI: Good eye closure, EOMI.     CN V: Facial sensation intact and symmetric.     CN VII: No facial asymmetry.     CN VIII: Hearing intact.     CN IX and X: Palate elevates symmetrically. Normal gag reflex.    CN XI: Symmetric shoulder shrug.     CN XII: Tongue midline.    Sensory: Intact light touch, vibration and temperature.    Coordination: No evidence of past-pointing on finger to nose testing, no dysdiadochokinesia. Heel to shin intact.    DTR's: 2+ throughout without clonus.    Babinski: Toes downgoing bilaterally.   Romberg: Negative.   Movements: No tremors observed.   Musculoskeletal:    Strength: 5/5 in upper and lower extremities bilaterally.   Gait: Steady, narrow based.    Tone: Normal bulk and tone.   Joints: No swelling.     Assessment/Plan:    Memory changes  32 yo female with pmhx of hld, generalized anxiety disorder, social anxiety, major depressive disorder, and avoidant personality disorder presents with forgetfulness and memory changes for many years, and she is concerned this may be the result of a broader systemic issue, seeks neurological opinion. Ms. Pete and I had a lengthy discussion regarding the possible etiology of her complaints. Her neurological examination is within normal limits today. I suspect, given her young age and recent psychiatric diagnoses, as well as chronic nature of her forgetfulness, she may have some element of pseudo dementia. If she has some type of underlying genetic syndrome contributing to her presentation, or if she does indeed have Aspberger's, that would be very difficult to identify without genetic microarray testing which is often expensive, not-covered by insurance, and done by a pediatric  neurologist in childhood. Her history does not strike me as any sort of underlying premature dementia. I recommended we check reversible causes of dementia and a brain MRI to rule out structural abnormalities.     Plan:  1. Check brain MRI w/o contrast  2. Check TSH, RPR, Vitamin B12 as reversible causes of dementia.       Greater than 50% of this 60 minute face to face encounter was devoted to disease state counseling on dementia and coordination of care. (see above assessment and plan for further details of discussion).     Deann Harris D.O., M.P.H  MS specialist.   Board Certified Neurologist.  Neurology Clerkship Director, [facility].    Neurology,  [facility].   Tel: [phone]  Fax: [phone]

## 2019-01-10 ENCOUNTER — TELEPHONE (OUTPATIENT)
Dept: INTERNAL MEDICINE | Facility: MEDICAL CENTER | Age: 34
End: 2019-01-10

## 2019-01-10 DIAGNOSIS — B07.0 PLANTAR WARTS: ICD-10-CM

## 2019-01-10 NOTE — TELEPHONE ENCOUNTER
Pt is scheduled with Dr Rosenthal (derm) on Tuesday. I need a new STAT [facility] referral submitted for warts please.

## 2019-01-15 ENCOUNTER — OFFICE VISIT (OUTPATIENT)
Dept: INTERNAL MEDICINE | Facility: MEDICAL CENTER | Age: 34
End: 2019-01-15
Payer: MEDICAID

## 2019-01-15 VITALS — HEIGHT: 71 IN | WEIGHT: 218 LBS | BODY MASS INDEX: 30.52 KG/M2

## 2019-01-15 DIAGNOSIS — B07.0 PLANTAR WARTS: ICD-10-CM

## 2019-01-15 PROCEDURE — 17110 DESTRUCTION B9 LES UP TO 14: CPT | Performed by: DERMATOLOGY

## 2019-01-15 NOTE — PROGRESS NOTES
"Shakira Pete  1985  [id number]    Consultation             Vitals:    01/15/19 1122   Weight: 98.9 kg (218 lb)   Height: 1.791 m (5' 10.5\")       Chief Complaint   Patient presents with   • Warts     plantar      ___________________________________________________________________            History of Present Illness (HPI) c/o wart on L foot x few months. Prior rx: Ln2 in past.            Dr. Marinelli has referred for a consultation to evaluate spots.     Current Outpatient Prescriptions on File Prior to Visit   Medication Sig Dispense Refill   • clotrimazole (LOTRIMIN) 1 % Cream Apply to affected area twice a day, antifungal 1 Tube 0     No current facility-administered medications on file prior to visit.      Patient has no known allergies.      Review of Systems:        General: Denies fever      Hematologic: no excessive bleeding problems              Past Medical History:   Diagnosis Date   • ADHD    • Anxiety    • Migraine      Skin Cancer  No h/o    Social History   Substance Use Topics   • Smoking status: Never Smoker   • Smokeless tobacco: Never Used   • Alcohol use Yes      Comment: rarely     Sunscreen Use:Yes    History reviewed. No pertinent surgical history.        Family History   Problem Relation Age of Onset   • Depression Mother    • Depression Sister    • Other Daughter         chronic constipation, fecal impaction as an infant   • No Known Problems Daughter          Physical Exam:   Constitutional: Well nourished, NAD, pleasant  alert and cooperative; normal mood and affect; normal attention span and concentration.    Skin   L heel with about 7 mm flat verrucous pap                Assessment and Plan:     1. Plantar warts  Will pare, Ln2 one spot on L heel  Liquid nitrogen was applied for 10-12 seconds to the skin lesion and the expected blistering or scabbing reaction explained. Do not pick at the area. Patient reminded to expect hypopigmented scars from the procedure. Return if " lesion fails to fully resolve.    Pt is still nursing so no bleomycin, no topicals for now.      Hue Rosenthal M.D.   Return in about 4 weeks (around 2/12/2019).

## 2019-01-16 ENCOUNTER — OFFICE VISIT (OUTPATIENT)
Dept: INTERNAL MEDICINE | Facility: MEDICAL CENTER | Age: 34
End: 2019-01-16
Payer: MEDICAID

## 2019-01-16 VITALS
TEMPERATURE: 97.1 F | HEART RATE: 70 BPM | SYSTOLIC BLOOD PRESSURE: 100 MMHG | WEIGHT: 218 LBS | OXYGEN SATURATION: 97 % | DIASTOLIC BLOOD PRESSURE: 78 MMHG | HEIGHT: 71 IN | BODY MASS INDEX: 30.52 KG/M2

## 2019-01-16 DIAGNOSIS — R07.9 CHEST PAIN, UNSPECIFIED TYPE: ICD-10-CM

## 2019-01-16 DIAGNOSIS — R31.29 MICROHEMATURIA: ICD-10-CM

## 2019-01-16 DIAGNOSIS — F90.9 ATTENTION DEFICIT HYPERACTIVITY DISORDER (ADHD), UNSPECIFIED ADHD TYPE: ICD-10-CM

## 2019-01-16 DIAGNOSIS — F40.10 SOCIAL ANXIETY DISORDER: ICD-10-CM

## 2019-01-16 DIAGNOSIS — F32.9 MAJOR DEPRESSIVE DISORDER WITH CURRENT ACTIVE EPISODE, UNSPECIFIED DEPRESSION EPISODE SEVERITY, UNSPECIFIED WHETHER RECURRENT: ICD-10-CM

## 2019-01-16 DIAGNOSIS — B07.0 PLANTAR WARTS: ICD-10-CM

## 2019-01-16 DIAGNOSIS — Z12.4 ROUTINE PAPANICOLAOU SMEAR: ICD-10-CM

## 2019-01-16 PROBLEM — F90.0 ATTENTION DEFICIT HYPERACTIVITY DISORDER, PREDOMINANTLY INATTENTIVE TYPE: Status: ACTIVE | Noted: 2018-04-11

## 2019-01-16 PROCEDURE — 93000 ELECTROCARDIOGRAM COMPLETE: CPT | Performed by: INTERNAL MEDICINE

## 2019-01-16 PROCEDURE — 99214 OFFICE O/P EST MOD 30 MIN: CPT | Mod: GC | Performed by: INTERNAL MEDICINE

## 2019-01-16 NOTE — PROGRESS NOTES
Established Patient    Shakira presents today with the following:    CC:   Chest pain  Anxiety    HPI:   33-year-old patient with a past medical history of depression presented to the clinic for follow-up care.  Her recent neuropsychological evaluation to clarify current cognitive functioning was performed in [place].  Ms. Pete believed that she was on the autism spectrum this was diagnosed in [place].  To clarify her diagnosis she requested neuropsychiatry evaluation and renal however no providers are available in town to assist with this diagnosis beyond the age of 22.  She was evaluated by Dr. Yin Cuadra, clinical neuropsychologist and [place].  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  She came in for 6-month follow-up  And reports mild chest pain, sharp in type, nonradiating, located on the left side, reproducible, not associated with nausea, vomiting, dizziness, palpitations, shortness of breath, cough, phlegm production, fevers.  She reports increased anxiety in her life over the past with stress related to social, financial, and issues with her kids.  Denies any SI/HI.  Reports adequate sleep.  Reports loss of interest, loss of energy, difficulty and intention.  She has not been evaluated by speech therapy.  She did not call to schedule an appointment with psychiatry but is following psychology.  She does not want to take psychotropic medications as she is breast-feeding and is planning on doing so for the next 6 months.  She was evaluated by neurology.    Patient Active Problem List    Diagnosis Date Noted   • Chest pain 01/16/2019   • Generalized anxiety disorder 11/09/2018   • Social anxiety disorder 11/09/2018   • Personality disorder in adult (HCC) 11/09/2018   • Plantar warts  "11/09/2018   • Disturbance of skin sensation 07/10/2018   • Pain of breast during breastfeeding 07/10/2018   • Rash 07/10/2018   • Microhematuria 05/16/2018   • Major depressive disorder 05/16/2018   • Dysthymia 04/11/2018   • Attention deficit hyperactivity disorder, predominantly inattentive type 04/11/2018   • Dyslipidemia 04/11/2018   • Excess ear wax 04/11/2018       Current Outpatient Prescriptions   Medication Sig Dispense Refill   • clotrimazole (LOTRIMIN) 1 % Cream Apply to affected area twice a day, antifungal 1 Tube 0     No current facility-administered medications for this visit.        ROS: As per HPI. Additional pertinent symptoms as noted below.  All others reviewed and negative    Family History   Problem Relation Age of Onset   • Depression Mother    • Depression Sister    • Other Daughter         chronic constipation, fecal impaction as an infant   • No Known Problems Daughter      Social History   Substance Use Topics   • Smoking status: Never Smoker   • Smokeless tobacco: Never Used   • Alcohol use Yes      Comment: rarely     History reviewed. No pertinent surgical history.  Allergies: Patient has no known allergies.    /78 (BP Location: Left arm)   Pulse 70   Temp 36.2 °C (97.1 °F)   Ht 1.791 m (5' 10.5\")   Wt 98.9 kg (218 lb)   SpO2 97%   BMI 30.84 kg/m²     Physical Exam   Constitutional:  oriented to person, place, and time. No distress.   Eyes: Pupils are equal, round, and reactive to light. No scleral icterus.  Neck: Neck supple. No thyromegaly present.   Cardiovascular: Normal rate, regular rhythm and normal heart sounds.  Exam reveals no gallop and no friction rub.  No murmur heard.  Pulmonary/Chest: Breath sounds normal. Chest wall is not dull to percussion.   Musculoskeletal:   no edema.   Lymphadenopathy: no cervical adenopathy  Neurological: alert and oriented to person, place, and time.   Skin: Left heel plantar wart noted       Assessment and Plan    1. Attention " deficit hyperactivity disorder (ADHD), unspecified ADHD type  2. Social anxiety disorder  3. Major depressive disorder with current active episode, unspecified depression episode severity, unspecified whether recurrent  -Patient was incorrectly diagnosed to be on the autism spectrum however  recent neuropsych evaluation was completed  -Neurology consult for neurological evaluation recommended to rule out contributions into her current cognitive functioning.  -Psychiatry referral for psychiatry intervention given her severe major depressive disorder, recurrent.  Patient needs to call to schedule.  Information provided  -Continue psychotherapy utilizing CBT  -Speech therapy for cognitive compensatory strategies targeting cognitive weaknesses        5. Routine Papanicolaou smear  Performed through gynecology    6. Chest pain, unspecified type  -Atypical in nature likely attributed to anxiety  -Patient does not wish to be on any medications  -Continue psychology appointments and establish with psychiatry      7. Microhematuria  -Reports microhematuria was noted on urinalysis completed at the gynecology office.  Patient is extremely concerned about this.  -Reassurance provided to the patient.  She is currently empirically being treated for urinary tract infection with Macrobid  -urine myoglobin negative  -Not associated with menses, exercise, or trauma  -Negative flank tenderness  -CT abdomen and pelvis ordered  Return in about 6 months (around 7/16/2019).   Signed by: Zacarias Calero M.D.

## 2019-01-16 NOTE — PATIENT INSTRUCTIONS
Referral information sent to the following:     [facility]  [phone]     Patient Care Coordination notes:Referral faxed      [facility]  Phone:[phone]

## 2019-01-20 PROBLEM — R41.3 MEMORY CHANGES: Status: ACTIVE | Noted: 2019-01-20

## 2019-01-21 ENCOUNTER — HOSPITAL ENCOUNTER (OUTPATIENT)
Dept: RADIOLOGY | Facility: MEDICAL CENTER | Age: 34
End: 2019-01-21
Attending: PSYCHIATRY & NEUROLOGY
Payer: MEDICAID

## 2019-01-21 DIAGNOSIS — R41.3 MEMORY CHANGES: ICD-10-CM

## 2019-01-21 PROCEDURE — 70551 MRI BRAIN STEM W/O DYE: CPT

## 2019-01-21 NOTE — ASSESSMENT & PLAN NOTE
34 yo female with pmhx of hld, generalized anxiety disorder, social anxiety, major depressive disorder, and avoidant personality disorder presents with forgetfulness and memory changes for many years, and she is concerned this may be the result of a broader systemic issue, seeks neurological opinion. Ms. Pete and I had a lengthy discussion regarding the possible etiology of her complaints. Her neurological examination is within normal limits today. I suspect, given her young age and recent psychiatric diagnoses, as well as chronic nature of her forgetfulness, she may have some element of pseudo dementia. If she has some type of underlying genetic syndrome contributing to her presentation, or if she does indeed have Aspberger's, that would be very difficult to identify without genetic microarray testing which is often expensive, not-covered by insurance, and done by a pediatric neurologist in childhood. Her history does not strike me as any sort of underlying premature dementia. I recommended we check reversible causes of dementia and a brain MRI to rule out structural abnormalities.     Plan:  1. Check brain MRI w/o contrast  2. Check TSH, RPR, Vitamin B12 as reversible causes of dementia.

## 2019-01-28 ENCOUNTER — TELEPHONE (OUTPATIENT)
Dept: INTERNAL MEDICINE | Facility: MEDICAL CENTER | Age: 34
End: 2019-01-28

## 2019-01-28 NOTE — TELEPHONE ENCOUNTER
----- Message from Cari Saavedra sent at 1/28/2019  2:12 PM PST -----  Regarding: KEMAR Irving from [facility] ([phone]) called to get clarification on CT orders Dr. Calero ordered for this patient. Patient has an appt tomorrow.

## 2019-01-29 ENCOUNTER — HOSPITAL ENCOUNTER (OUTPATIENT)
Dept: RADIOLOGY | Facility: MEDICAL CENTER | Age: 34
End: 2019-01-29
Attending: INTERNAL MEDICINE
Payer: MEDICAID

## 2019-01-29 DIAGNOSIS — R31.29 MICROHEMATURIA: ICD-10-CM

## 2019-01-29 PROCEDURE — 700117 HCHG RX CONTRAST REV CODE 255: Performed by: INTERNAL MEDICINE

## 2019-01-29 PROCEDURE — 74178 CT ABD&PLV WO CNTR FLWD CNTR: CPT

## 2019-01-29 RX ADMIN — IOHEXOL 100 ML: 350 INJECTION, SOLUTION INTRAVENOUS at 15:45

## 2019-02-12 ENCOUNTER — OFFICE VISIT (OUTPATIENT)
Dept: INTERNAL MEDICINE | Facility: MEDICAL CENTER | Age: 34
End: 2019-02-12
Payer: MEDICAID

## 2019-02-12 VITALS — WEIGHT: 215.4 LBS | BODY MASS INDEX: 30.15 KG/M2 | HEIGHT: 71 IN

## 2019-02-12 DIAGNOSIS — B07.0 PLANTAR WARTS: ICD-10-CM

## 2019-02-12 DIAGNOSIS — L85.9 HYPERKERATOSIS OF SOLE: ICD-10-CM

## 2019-02-12 PROCEDURE — 99213 OFFICE O/P EST LOW 20 MIN: CPT | Performed by: DERMATOLOGY

## 2019-02-12 NOTE — PROGRESS NOTES
"Shakira Pete  1985  [id number]    Follow up             Vitals:    02/12/19 0849   Weight: 97.7 kg (215 lb 6.4 oz)   Height: 1.791 m (5' 10.5\")       Chief Complaint   Patient presents with   • Warts     F/u      ___________________________________________________________________            History of Present Illness (HPI) f/u wart on L heel - s/p Ln2 last visit. Pt said treatment wasnt too bad, but not sure if better.     Current Outpatient Prescriptions on File Prior to Visit   Medication Sig Dispense Refill   • clotrimazole (LOTRIMIN) 1 % Cream Apply to affected area twice a day, antifungal 1 Tube 0     No current facility-administered medications on file prior to visit.      Patient has no known allergies.      Review of Systems:        General: Denies fever      Hematologic: no excessive bleeding problems              Past Medical History:   Diagnosis Date   • ADHD    • Anxiety    • Migraine      Skin Cancer  No h/o    Social History   Substance Use Topics   • Smoking status: Never Smoker   • Smokeless tobacco: Never Used   • Alcohol use Yes      Comment: rarely     Sunscreen Use:Yes    History reviewed. No pertinent surgical history.        Family History   Problem Relation Age of Onset   • Depression Mother    • Depression Sister    • Other Daughter         chronic constipation, fecal impaction as an infant   • No Known Problems Daughter          Physical Exam:   Constitutional: Well nourished, NAD, pleasant  alert and cooperative; normal mood and affect; normal attention span and concentration.    Skin   L heel with no mm flat verrucous pap  Heel with extensive hyperkeratosis                 Assessment and Plan:     1. Plantar warts  Wart on L foot appears gone after 1 treatment with Ln2. Explained can come back b/c it is a virus. If spot comes back, then f/u.       2. Hyperkeratosis of sole  After shower in the morning, then use pumice stone to remove dead skin on heel of foot.   Then " apply moisturizer - ammonium lactate or lac hydrin.   Try thick moisturizers at night. Wear socks.         Hue Rosenthal M.D.   Return if symptoms worsen or fail to improve.

## 2019-05-17 ENCOUNTER — NON-PROVIDER VISIT (OUTPATIENT)
Dept: NEUROLOGY | Facility: MEDICAL CENTER | Age: 34
End: 2019-05-17
Payer: MEDICAID

## 2019-05-17 DIAGNOSIS — R41.840 INATTENTION: ICD-10-CM

## 2019-05-17 PROCEDURE — 95951 EEG: CPT | Mod: 52 | Performed by: PSYCHIATRY & NEUROLOGY

## 2019-05-17 NOTE — PROCEDURES
VIDEO ELECTROENCEPHALOGRAM REPORT      Referring provider: Dr. Harris.     DOS: 5/17/2019 (total recording of 28 minutes).     INDICATION:  Shakira Pete 34 y.o. female presenting with memory loss.     CURRENT ANTIEPILEPTIC REGIMEN: None.     TECHNIQUE: 30 channel video electroencephalogram (EEG) was performed in accordance with the international 10-20 system. The study was reviewed in bipolar and referential montages. The recording examined the patient during wakeful and drowsy/sleep state(s).     DESCRIPTION OF THE RECORD:  During the wakefulness, the background showed a symmetrical 10 Hz alpha activity posteriorly with amplitude of 70 mV.  There was reactivity to eye closure/opening.  A normal anterior-posterior gradient was noted with faster beta frequencies seen anteriorly.  During drowsiness, theta/delta frequencies were seen.    During the sleep state, background shows diffuse high-amplitude 4-5 Hz delta activity.  Symmetrical high-amplitude sleep spindles and vertex sharps were seen in the leads over the central regions.     ACTIVATION PROCEDURES:   Hyperventilation was performed by the patient for a total of 3 minutes. The technician performing the test noted good effort. This technique failed to produce any significant electroencephalographic changes.    Intermittent Photic stimulation was performed in a stepwise fashion from 1 to 30 Hz and elicited a normal response (photic driving), most noticeable in the posterior leads.      ICTAL AND/OR INTERICTAL FINDINGS:   No focal or generalized epileptiform activity noted. No regional slowing was seen during this routine study.  No clinical events or seizures were reported or recorded during the study.     EKG: sampling of the EKG recording demonstrated sinus rhythm.     EVENTS:  None.     INTERPRETATION:  This is a normal video EEG recording in the awake, drowsy, and sleep state(s).  Clinical correlation is recommended.    Note: A normal EEG does not rule out  epilepsy.  If the clinical suspicion remains high for seizures, a prolonged recording to capture clinical or subclinical events may be helpful.        Domenic Plaza MD   Epilepsy and Neurodiagnostics.   Clinical  of Neurology [facility] of Medicine.   Diplomate in Neurology, Epilepsy, and Electrodiagnostic Medicine.   Office: [phone]  Fax: [phone]

## 2019-05-24 ENCOUNTER — OFFICE VISIT (OUTPATIENT)
Dept: NEUROLOGY | Facility: MEDICAL CENTER | Age: 34
End: 2019-05-24
Payer: MEDICAID

## 2019-05-24 VITALS
WEIGHT: 208 LBS | OXYGEN SATURATION: 97 % | HEIGHT: 71 IN | SYSTOLIC BLOOD PRESSURE: 98 MMHG | BODY MASS INDEX: 29.12 KG/M2 | TEMPERATURE: 97.9 F | HEART RATE: 67 BPM | DIASTOLIC BLOOD PRESSURE: 74 MMHG

## 2019-05-24 DIAGNOSIS — R41.3 MEMORY CHANGES: ICD-10-CM

## 2019-05-24 PROCEDURE — 99214 OFFICE O/P EST MOD 30 MIN: CPT | Performed by: PSYCHIATRY & NEUROLOGY

## 2019-05-24 NOTE — PROGRESS NOTES
"CC: Memory Changes    HPI:   Ms. Shakira Pete returns today in Neurologic follow up for memory changes. She is unaccompanied to today's visit. She had a recent brain MRI and EEG and is here to review the results today. In the middle of conversation, she continues to lose her train of thought very easily. She denies any new symptoms concerning for seizure.      ROS  A comprehensive review of systems was performed and was negative except for that mentioned in the HPI.    Physical Exam:  Ambulatory Vitals  Encounter Vitals  Temperature: 36.6 °C (97.9 °F)  Temp src: Temporal  Blood Pressure: (!) 98/74  Pulse: 67  Pulse Oximetry: 97 %  Weight: 94.3 kg (208 lb)  Height: 179.1 cm (5' 10.5\")  BMI (Calculated): 29.42  Constitutional: Well-developed, well-nourished, good hygiene. Appears stated age.  Cardiovascular: RRR, with no murmurs, rubs or gallops. No carotid bruits.   Respiratory: Lungs CTA B/L, no W/R/R.   Abdomen: Soft Non-tender to Palpation. Non-distended.  Extremities: No peripheral edema, pedal pulses intact.   Skin: Warm, dry, intact. No rashes observed.  Eyes: Sclera anicteric. No nystagmus observed.   Neurologic:   Mental Status: Awake and alert.    Speech: Speech is fluent with normal prosody.   .           Fund of Knowledge: Appropriate   Cranial Nerves:    CN II: Pupils are equal and react appropriately. No APD noted.    CN III, IV, VI: Good eye closure. Extraocular movements are intact.     CN V: Facial sensation is intact and symmetric.     CN VII: No evidence of facial droop.     CN VIII: Hearing is grossly intact.    CN IX and X: Palate elevates symmetrically.    CN XI: Shoulder shrug is symmetric.     CN XII: Tongue is midline.   Sensory: Sensation is intact to vibration and temperature.    Coordination: There is no discoordination detected with finger tapping or finger to nose testing.        DTR's: Reflexes are 2+ and symmetrical.   Babinski: Toes are downgoing bilaterally.    Romberg: " "Deferred.   Movements: No tremors noted.  Musculoskeletal:    Strength:   Deltoids      R 5/5 L 5/5  Biceps        R 5/5 L 5/5  Triceps       R 5/5 L 5/5  Wrist Ext    R 5/5 L 5/5  Finger Flex R 5/5 L 5/5  Finger Ext   R 5/5 L 5/5  Hip Flex      R 5/5 L 5/5  Knee Flex   R 5/5 L 5/5  Knee Ext    R 5/5 L 5/5  Ankle DF    R 5/5 L 5/5  Ankle PF    R 5/5 L 5/5   Gait: Gait is narrow based and steady.    Tone: Normal bulk and tone.    Joints: No joint swelling.   Psychiatric: Mood and affect are appropriate.     Medications:     Current Outpatient Prescriptions:   •  clotrimazole (LOTRIMIN) 1 % Cream, Apply to affected area twice a day, antifungal (Patient not taking: Reported on 5/24/2019), Disp: 1 Tube, Rfl: 0    MRI Imaging Reviewed:   Today I reviewed the patient's most recent MRI images with her in the examination room. I explained basic terminology of MRI's, verbalized my assessment, and answered her questions.     MRI Brain w/wo contrast from 1/21/19  1.  Asymmetric prominent occipitotemporal sulcus in the left temporal lobe (lateral to the collateral sulcus). No underlying hippocampal sclerosis or hippocampal malrotation. Uncertain but likely doubtful significance.  2.  Otherwise, unremarkable MRI of the brain without contrast.      Assessment and Plan:     Memory changes  Ms. Shakira Pete is a 35 yo F with pmhx of hyperlipidemia, generalized anxiety disorder, social anxiety, major depressive disorder, and avoidant personality disorder who presented with many years of subjective memory changes. She is most concerned that she may develop early onset Alzheimer's dementia as her paternal grandmother and great grandmother both had Alzheimer's dementia. She believes her grandmother began exhibiting symptoms of memory changes in her 50's. Today we had a discussion regarding what constitutes \"early onset\" Alzheimer's. Typically it is thought that onset before the age of 60 would be concerning for a familial form of the " disease. She is not entirely sure that this age is true, but she believe her father would know. She is interested in genetic testing to this effect. I recommended a consultation with my colleague, Dr. Brionna Dooley who can best guide her as to the appropriateness and process of genetic testing for suspected familial Alzheimer's.     We reviewed her recent brain MRI together in the exam room and I explained the radiologist's finding of an enlarged occipital sulcus. This finding is very non-specific, and given her age most likely represents a congenital variation. There was no other structural abnormality to account for her memory changes. Her EEG was normal. I think in this context, it is unlikely that her difficulty concentrating represents underlying seizure activity.     Plan:  1. Referral to Dr. Brionna Dooley.      Greater than 50% of this 25 minute face to face visit was spent on disease state counseling on dementia and coordination of care. Additional time was spent reviewing her MRI imaging with her in the exam room.       Deann Harris D.O., M.P.H  MS specialist.   Board Certified Neurologist.  Neurology Clerkship Director, [facility].    Neurology,  [facility].   Tel: [phone]  Fax: [phone]

## 2019-05-28 NOTE — ASSESSMENT & PLAN NOTE
"Ms. Shakira Pete is a 33 yo F with pmhx of hyperlipidemia, generalized anxiety disorder, social anxiety, major depressive disorder, and avoidant personality disorder who presented with many years of subjective memory changes. She is most concerned that she may develop early onset Alzheimer's dementia as her paternal grandmother and great grandmother both had Alzheimer's dementia. She believes her grandmother began exhibiting symptoms of memory changes in her 50's. Today we had a discussion regarding what constitutes \"early onset\" Alzheimer's. Typically it is thought that onset before the age of 60 would be concerning for a familial form of the disease. She is not entirely sure that this age is true, but she believe her father would know. She is interested in genetic testing to this effect. I recommended a consultation with my colleague, Dr. Brionna Dooley who can best guide her as to the appropriateness and process of genetic testing for suspected familial Alzheimer's.     We reviewed her recent brain MRI together in the exam room and I explained the radiologist's finding of an enlarged occipital sulcus. This finding is very non-specific, and given her age most likely represents a congenital variation. There was no other structural abnormality to account for her memory changes. Her EEG was normal. I think in this context, it is unlikely that her difficulty concentrating represents underlying seizure activity.     Plan:  1. Referral to Dr. Brionna Dooley.  "

## 2019-06-05 ENCOUNTER — TELEPHONE (OUTPATIENT)
Dept: INTERNAL MEDICINE | Facility: MEDICAL CENTER | Age: 34
End: 2019-06-05

## 2019-06-05 ENCOUNTER — OFFICE VISIT (OUTPATIENT)
Dept: INTERNAL MEDICINE | Facility: MEDICAL CENTER | Age: 34
End: 2019-06-05
Payer: MEDICAID

## 2019-06-05 VITALS
WEIGHT: 205.2 LBS | HEIGHT: 71 IN | SYSTOLIC BLOOD PRESSURE: 93 MMHG | TEMPERATURE: 97.2 F | DIASTOLIC BLOOD PRESSURE: 57 MMHG | BODY MASS INDEX: 28.73 KG/M2 | HEART RATE: 77 BPM | OXYGEN SATURATION: 93 %

## 2019-06-05 DIAGNOSIS — F41.1 GENERALIZED ANXIETY DISORDER: ICD-10-CM

## 2019-06-05 DIAGNOSIS — E66.3 OVERWEIGHT: ICD-10-CM

## 2019-06-05 DIAGNOSIS — F32.9 MAJOR DEPRESSIVE DISORDER WITH CURRENT ACTIVE EPISODE, UNSPECIFIED DEPRESSION EPISODE SEVERITY, UNSPECIFIED WHETHER RECURRENT: ICD-10-CM

## 2019-06-05 DIAGNOSIS — F90.0 ATTENTION DEFICIT HYPERACTIVITY DISORDER, PREDOMINANTLY INATTENTIVE TYPE: ICD-10-CM

## 2019-06-05 DIAGNOSIS — F90.9 ATTENTION DEFICIT HYPERACTIVITY DISORDER (ADHD), UNSPECIFIED ADHD TYPE: ICD-10-CM

## 2019-06-05 DIAGNOSIS — F60.9 PERSONALITY DISORDER IN ADULT (HCC): ICD-10-CM

## 2019-06-05 DIAGNOSIS — F40.10 SOCIAL ANXIETY DISORDER: ICD-10-CM

## 2019-06-05 PROBLEM — H61.20 EXCESS EAR WAX: Status: RESOLVED | Noted: 2018-04-11 | Resolved: 2019-06-05

## 2019-06-05 PROBLEM — R41.3 MEMORY CHANGES: Status: RESOLVED | Noted: 2019-01-20 | Resolved: 2019-06-05

## 2019-06-05 PROBLEM — R20.9 DISTURBANCE OF SKIN SENSATION: Status: RESOLVED | Noted: 2018-07-10 | Resolved: 2019-06-05

## 2019-06-05 PROBLEM — R07.9 CHEST PAIN: Status: RESOLVED | Noted: 2019-01-16 | Resolved: 2019-06-05

## 2019-06-05 PROCEDURE — 99213 OFFICE O/P EST LOW 20 MIN: CPT | Mod: GE | Performed by: INTERNAL MEDICINE

## 2019-06-05 ASSESSMENT — PAIN SCALES - GENERAL: PAINLEVEL: NO PAIN

## 2019-06-05 NOTE — PROGRESS NOTES
Established Patient    Shakira presents today with the following:    CC:   SHANELLE  Depression     HPI:     34-year-old patient with a past medical history of depression presented to the clinic for follow-up care.  Her recent neuropsychological evaluation to clarify current cognitive functioning was performed in [place].  Ms. Pete believed that she was on the autism spectrum this was diagnosed in [place].  To clarify her diagnosis she requested neuropsychiatry evaluation and renal however no providers are available in [facility] to assist with this diagnosis beyond the age of 22.  She was evaluated by Dr. Yin Cuadra, clinical neuropsychologist and [place].  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 She reports improving mood with reduced life stressors. Patient has an upcoming cystoscopy to evaluate microhematuria. She was advised to follow up with [facility] neurology for genetic testing for familial alzheimer     Patient Active Problem List    Diagnosis Date Noted   • Hyperkeratosis of sole 02/12/2019   • Generalized anxiety disorder 11/09/2018   • Social anxiety disorder 11/09/2018   • Personality disorder in adult (HCC) 11/09/2018   • Plantar warts 11/09/2018   • Pain of breast during breastfeeding 07/10/2018   • Rash 07/10/2018   • Microhematuria 05/16/2018   • Major depressive disorder 05/16/2018   • Dysthymia 04/11/2018   • Attention deficit hyperactivity disorder, predominantly inattentive type 04/11/2018   • Dyslipidemia 04/11/2018       No current outpatient prescriptions on file.     No current facility-administered medications for this visit.        ROS: As per HPI. Additional pertinent symptoms as noted below.    All others reviewed and negative    Family History   Problem Relation Age of Onset  "  • Depression Mother    • Depression Sister    • Other Daughter         chronic constipation, fecal impaction as an infant   • No Known Problems Daughter      Social History   Substance Use Topics   • Smoking status: Never Smoker   • Smokeless tobacco: Never Used   • Alcohol use Yes      Comment: rarely     No past surgical history on file.  Allergies: Patient has no known allergies.    BP (!) 93/57 (BP Location: Right arm, Patient Position: Sitting)   Pulse 77   Temp 36.2 °C (97.2 °F) (Temporal)   Ht 1.791 m (5' 10.51\")   Wt 93.1 kg (205 lb 3.2 oz)   LMP  (LMP Unknown)   SpO2 93%   BMI 29.02 kg/m²     Physical Exam   Constitutional:  oriented to person, place, and time. No distress.   Eyes: Pupils are equal, round, and reactive to light. No scleral icterus.  Neck: Neck supple. No thyromegaly present.   Cardiovascular: Normal rate, regular rhythm and normal heart sounds.  Exam reveals no gallop and no friction rub.  No murmur heard.  Pulmonary/Chest: Breath sounds normal. Chest wall is not dull to percussion.   Musculoskeletal:   no edema.   Lymphadenopathy: no cervical adenopathy  Neurological: alert and oriented to person, place, and time.   Skin: Left heel plantar wart noted      Assessment and Plan    1. Attention deficit hyperactivity disorder (ADHD), unspecified ADHD type  2. Social anxiety disorder  3. Major depressive disorder with current active episode, unspecified depression episode severity, unspecified whether recurrent  -Patient was incorrectly diagnosed to be on the autism spectrum however  recent neuropsych evaluation was completed  -Neurology consult for neurological evaluation recommended to rule out contributions into her current cognitive functioning.  -Psychiatry referral for psychiatry intervention given her severe major depressive disorder, recurrent.    -Continue psychotherapy utilizing CBT  -Speech therapy for cognitive compensatory strategies targeting cognitive weaknesses "         4. Microhematuria  -present for years as per the patient. Confirmed with [facility]   - urology consult placed. Cystoscopy pending.   -urine myoglobin negative  -Not associated with menses, exercise, or trauma  -Negative flank tenderness  -CT abdomen and pelvis negative.     5. Overweight  BMI: 29  Eats a diet filled with excessive calories with junk foods.  Consumes sugary beverages.   Lives a sedentary lifestyle  Encouraged aerobic exercise 30-45 mins/day 3-5 days of the week  Proper dietary counseling provided to the patient      Return in about 1 year (around 6/5/2020).   Signed by: Zacarias Calero M.D.

## 2019-06-05 NOTE — TELEPHONE ENCOUNTER
----- Message from Cari Saavedra sent at 6/5/2019 11:36 AM PDT -----  Regarding: KEMAR LUONG  Contact: [phone]  Patient calling regarding speech therapy referral. She says she has had trouble getting an appt with them. That office says they need more information from our office but we haven't contacted them. She doesn't know if its a new referral or notes or what. Can we look into this please and give her a call?

## 2019-06-12 NOTE — TELEPHONE ENCOUNTER
A new order has been placed. Attempted to contact [phone], however nor response. Asked to call back.

## 2019-08-28 ENCOUNTER — OFFICE VISIT (OUTPATIENT)
Dept: NEUROLOGY | Facility: MEDICAL CENTER | Age: 34
End: 2019-08-28
Payer: MEDICAID

## 2019-08-28 VITALS
HEIGHT: 70 IN | DIASTOLIC BLOOD PRESSURE: 60 MMHG | SYSTOLIC BLOOD PRESSURE: 98 MMHG | RESPIRATION RATE: 16 BRPM | WEIGHT: 207 LBS | OXYGEN SATURATION: 95 % | TEMPERATURE: 97.5 F | HEART RATE: 73 BPM | BODY MASS INDEX: 29.63 KG/M2

## 2019-08-28 DIAGNOSIS — F32.1 CURRENT MODERATE EPISODE OF MAJOR DEPRESSIVE DISORDER WITHOUT PRIOR EPISODE (HCC): ICD-10-CM

## 2019-08-28 DIAGNOSIS — R41.89 PSEUDODEMENTIA: ICD-10-CM

## 2019-08-28 DIAGNOSIS — F90.9 ATTENTION DEFICIT HYPERACTIVITY DISORDER (ADHD), UNSPECIFIED ADHD TYPE: ICD-10-CM

## 2019-08-28 PROCEDURE — 99213 OFFICE O/P EST LOW 20 MIN: CPT

## 2019-08-28 ASSESSMENT — ENCOUNTER SYMPTOMS
INSOMNIA: 1
MEMORY LOSS: 1
NERVOUS/ANXIOUS: 1
DEPRESSION: 1

## 2019-08-28 NOTE — PROGRESS NOTES
Patient of Dr avalos sent for memory loss.    HPI  35 yo female with ADD untreated,avoidant personality disorder, depression (untreated) generalized anxiety disorder comes for f/u for memory loss.   She had MRI brain and EEG during her workup with Dr avalos as well as neuropsychological testing (scanned in [facility]) in [place] in November of last year. MRI brain revealed asymmetric prominent occipitotemporal sulcus in left temporal lobe with no abnormalities in hippocampi. EEG did not reveal any abnormalities.  Neuropsychological testing did not raise concerns for dementia and yet revealed above problems as causative factors for her difficulty focusing and remembering.  She is wondering if her ADD and anxiety should be treated again as she is off all her medications and breastfeeding her 2 year old child.  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 She is full time mom to two kids aged 5 and 2. Has reportedly no time for herself does not exercise poor sleep etc.  She is most concerned that she may develop early onset Alzheimer's dementia as her paternal grandmother and great grandmother both had Alzheimer's dementia. She believes her grandmother began exhibiting symptoms of memory changes in her 50's.    Review of Systems   Psychiatric/Behavioral: Positive for depression and memory loss. The patient is nervous/anxious and has insomnia.      Past Medical History:   Diagnosis Date   • ADHD    • Anxiety    • Migraine    No past surgical history on file.     Family History   Problem Relation Age of Onset   • Depression Mother    • Depression Sister    • Other Daughter         chronic constipation, fecal impaction as an infant   • No Known Problems Daughter      Social History     Socioeconomic History   • Marital status:      Spouse name: Not on file   • Number of children: Not on file   • Years of education: Not on file   • Highest education level: Not on file  "  Occupational History   • Not on file   Social Needs   • Financial resource strain: Not on file   • Food insecurity:     Worry: Not on file     Inability: Not on file   • Transportation needs:     Medical: Not on file     Non-medical: Not on file   Tobacco Use   • Smoking status: Never Smoker   • Smokeless tobacco: Never Used   Substance and Sexual Activity   • Alcohol use: Yes     Comment: rarely   • Drug use: No   • Sexual activity: Not on file   Lifestyle   • Physical activity:     Days per week: Not on file     Minutes per session: Not on file   • Stress: Not on file   Relationships   • Social connections:     Talks on phone: Not on file     Gets together: Not on file     Attends Buddhism service: Not on file     Active member of club or organization: Not on file     Attends meetings of clubs or organizations: Not on file     Relationship status: Not on file   • Intimate partner violence:     Fear of current or ex partner: Not on file     Emotionally abused: Not on file     Physically abused: Not on file     Forced sexual activity: Not on file   Other Topics Concern   • Not on file   Social History Narrative   • Not on file     No current outpatient medications on file prior to visit.     No current facility-administered medications on file prior to visit.        No Known Allergies     Encounter Vitals  Standard Vitals  Vitals  Blood Pressure: (!) 98/60  Temperature: 36.4 °C (97.5 °F)  Temp src: Temporal  Pulse: 73  Respiration: 16  Pulse Oximetry: 95 %  Height: 177.8 cm (5' 10\")  Weight: 93.9 kg (207 lb)  Encounter Vitals  Temperature: 36.4 °C (97.5 °F)  Temp src: Temporal  Blood Pressure: (!) 98/60  Pulse: 73  Respiration: 16  Pulse Oximetry: 95 %  Weight: 93.9 kg (207 lb)  Height: 177.8 cm (5' 10\")  BMI (Calculated): 29.7  Physical examination   Constitutional: Well-developed, well-nourished, good hygiene. Appears stated age.  Cardiovascular: RRR, with no murmurs, rubs or gallops. No carotid bruits. "   Respiratory: Lungs CTA B/L, no W/R/R.   Abdomen: Soft Non-tender to Palpation. Non-distended.  Extremities: No peripheral edema, pedal pulses intact.   Skin: Warm, dry, intact. No rashes observed.  Eyes: Sclera anicteric   Funduscopic: Optic discs flat with no evidence of papilledema or pallor.   Neurologic:   Mental Status: Awake, alert, oriented x 3.   Speech: Fluent with normal prosody.   Memory: Able to recall recent and remote events accurately.    Concentration: Attentive. Able to focus on history and follow multi-step commands.              Fund of Knowledge: Appropriate   Cranial Nerves:    CN II: PERRL. No afferent pupillary defect.    CN III, IV, VI: Good eye closure, EOMI.     CN V: Facial sensation intact and symmetric.     CN VII: No facial asymmetry.     CN VIII: Hearing intact.     CN IX and X: Palate elevates symmetrically. Normal gag reflex.    CN XI: Symmetric shoulder shrug.     CN XII: Tongue midline.    Sensory: Intact light touch, vibration and temperature.    Coordination: No evidence of past-pointing on finger to nose testing, no dysdiadochokinesia. Heel to shin intact.             DTR's: 2+ throughout without clonus.    Babinski: Toes downgoing bilaterally.   Romberg: Negative.   Movements: No tremors observed.   Musculoskeletal:    Strength: 5/5 in upper and lower extremities bilaterally.   Gait: Steady, narrow based.    Tone: Normal bulk and tone.   Joints: No swelling.   Imaging and EEG   Reviewed personally   MRI Imaging Reviewed:   Today I reviewed the patient's most recent MRI images with her in the examination room. I explained basic terminology of MRI's, verbalized my assessment, and answered her questions.      MRI Brain w/wo contrast from 1/21/19  1.  Asymmetric prominent occipitotemporal sulcus in the left temporal lobe (lateral to the collateral sulcus). No underlying hippocampal sclerosis or hippocampal malrotation. Uncertain but likely doubtful significance.  2.  Otherwise,  unremarkable MRI of the brain without contrast.    EEG report  INTERPRETATION:  This is a normal video EEG recording in the awake, drowsy, and   sleep state(s).  Clinical correlation is recommended.    Note: A normal EEG does not rule out epilepsy.  If the clinical   suspicion remains high for seizures, a prolonged recording to   capture clinical or subclinical events may be helpful.  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   She has multiple psychological and psychiatric issues that need to be addressed ( see above)   She alread had neuropsych testing confirming pseudodementia   She does not have early onset AD and does not need to be tested now despite family history ( which is also uncertain)    Plan  Referral to Psychology   Referral to Psychiatry (she will stop breastfeeding soon and can go back on her medications)   Recommended de-stressing techniques, yoga, sleep   Proper diet  Avoiding stress  Recommended mag oxide   Coenzyme q10   Exercise at least 1h daily    RTC prn       Patient was seen for 25 minutes face to face of which > 50% of appointment time was spent on counseling and coordination of care regarding the above.

## 2020-04-03 ENCOUNTER — TELEPHONE (OUTPATIENT)
Dept: SCHEDULING | Facility: IMAGING CENTER | Age: 35
End: 2020-04-03

## 2020-05-04 RX ORDER — SULFAMETHOXAZOLE AND TRIMETHOPRIM 800; 160 MG/1; MG/1
TABLET ORAL
COMMUNITY
End: 2020-05-21

## 2020-05-04 RX ORDER — ONDANSETRON 4 MG/1
TABLET, ORALLY DISINTEGRATING ORAL
COMMUNITY
End: 2020-05-21

## 2020-05-04 RX ORDER — CEFDINIR 300 MG/1
CAPSULE ORAL
COMMUNITY
End: 2020-05-21

## 2020-05-21 ENCOUNTER — OFFICE VISIT (OUTPATIENT)
Dept: MEDICAL GROUP | Facility: MEDICAL CENTER | Age: 35
End: 2020-05-21
Attending: NURSE PRACTITIONER
Payer: MEDICAID

## 2020-05-21 VITALS
HEIGHT: 71 IN | DIASTOLIC BLOOD PRESSURE: 74 MMHG | OXYGEN SATURATION: 95 % | HEART RATE: 71 BPM | TEMPERATURE: 98.6 F | SYSTOLIC BLOOD PRESSURE: 112 MMHG | WEIGHT: 216.9 LBS | BODY MASS INDEX: 30.36 KG/M2

## 2020-05-21 DIAGNOSIS — Z13.6 SCREENING FOR CARDIOVASCULAR CONDITION: ICD-10-CM

## 2020-05-21 DIAGNOSIS — D22.9 FLESHY SKIN MOLE: ICD-10-CM

## 2020-05-21 DIAGNOSIS — Z13.21 ENCOUNTER FOR VITAMIN DEFICIENCY SCREENING: ICD-10-CM

## 2020-05-21 DIAGNOSIS — B07.0 PLANTAR WART: ICD-10-CM

## 2020-05-21 DIAGNOSIS — Z11.3 ROUTINE SCREENING FOR STI (SEXUALLY TRANSMITTED INFECTION): ICD-10-CM

## 2020-05-21 DIAGNOSIS — Z76.89 ENCOUNTER TO ESTABLISH CARE: ICD-10-CM

## 2020-05-21 DIAGNOSIS — Z13.228 SCREENING FOR METABOLIC DISORDER: ICD-10-CM

## 2020-05-21 PROBLEM — Z87.440 HISTORY OF URINARY TRACT INFECTION: Status: ACTIVE | Noted: 2019-06-11

## 2020-05-21 PROCEDURE — 99213 OFFICE O/P EST LOW 20 MIN: CPT | Mod: 25 | Performed by: NURSE PRACTITIONER

## 2020-05-21 PROCEDURE — 99213 OFFICE O/P EST LOW 20 MIN: CPT | Performed by: NURSE PRACTITIONER

## 2020-05-21 ASSESSMENT — PATIENT HEALTH QUESTIONNAIRE - PHQ9
9. THOUGHTS THAT YOU WOULD BE BETTER OFF DEAD, OR OF HURTING YOURSELF: NOT AT ALL
5. POOR APPETITE OR OVEREATING: SEVERAL DAYS
8. MOVING OR SPEAKING SO SLOWLY THAT OTHER PEOPLE COULD HAVE NOTICED. OR THE OPPOSITE, BEING SO FIGETY OR RESTLESS THAT YOU HAVE BEEN MOVING AROUND A LOT MORE THAN USUAL: SEVERAL DAYS
1. LITTLE INTEREST OR PLEASURE IN DOING THINGS: MORE THAN HALF THE DAYS
6. FEELING BAD ABOUT YOURSELF - OR THAT YOU ARE A FAILURE OR HAVE LET YOURSELF OR YOUR FAMILY DOWN: SEVERAL DAYS
3. TROUBLE FALLING OR STAYING ASLEEP OR SLEEPING TOO MUCH: MORE THAN HALF THE DAYS
SUM OF ALL RESPONSES TO PHQ QUESTIONS 1-9: 11
4. FEELING TIRED OR HAVING LITTLE ENERGY: MORE THAN HALF THE DAYS
2. FEELING DOWN, DEPRESSED, IRRITABLE, OR HOPELESS: SEVERAL DAYS
SUM OF ALL RESPONSES TO PHQ9 QUESTIONS 1 AND 2: 3
7. TROUBLE CONCENTRATING ON THINGS, SUCH AS READING THE NEWSPAPER OR WATCHING TELEVISION: SEVERAL DAYS

## 2020-05-21 ASSESSMENT — ENCOUNTER SYMPTOMS
PALPITATIONS: 0
FEVER: 0
COUGH: 0
WHEEZING: 0
ABDOMINAL PAIN: 0
BLOOD IN STOOL: 0
SHORTNESS OF BREATH: 0
WEIGHT LOSS: 0
CHILLS: 0
CONSTIPATION: 0
DIARRHEA: 0

## 2020-05-21 NOTE — PROGRESS NOTES
Chief Complaint   Patient presents with   • Establish Care   • Nevus   • Warts       Subjective:     HPI:   Shakira Pete is a 35 y.o. female here to establish care, wart,  and to discuss the evaluation and management of:      Encounter to establish care  Prior PCP: VLADIM[facility]    [facility]    Fleshy skin mole  Present for as long as she can remember.  It is located on her mid back to the right of spine.  It has been bothering her for a couple months, it has been swollen and catching on things.  Denies drainage.      Plantar warts  On the sole of her left foot, she has struggled with plantar wart for years.  She has been able to get the size down, but not completely gone.  It is not painful as it is smaller.  Denies drainage.        ROS  Review of Systems   Constitutional: Negative for chills, fever, malaise/fatigue and weight loss.   Respiratory: Negative for cough, shortness of breath and wheezing.    Cardiovascular: Negative for chest pain, palpitations and leg swelling.   Gastrointestinal: Negative for abdominal pain, blood in stool, constipation and diarrhea.         No Known Allergies    Current medicines (including changes today)  No current outpatient medications on file.     No current facility-administered medications for this visit.      She  has a past medical history of ADHD, Anxiety, Depression, and Migraine.  She  has a past surgical history that includes dental extraction(s).  Social History     Tobacco Use   • Smoking status: Never Smoker   • Smokeless tobacco: Never Used   Substance Use Topics   • Alcohol use: Yes     Comment: rarely   • Drug use: No       Family History   Problem Relation Age of Onset   • Depression Mother    • Depression Sister    • Depression Sister    • Other Daughter         chronic constipation, fecal impaction as an infant   • No Known Problems Daughter    • Pulmonary Embolism Paternal Grandmother      Family Status   Relation  "Name Status   • Mo  Alive   • Fa  Alive   • Sis  Alive   • Sis  Alive   • Huey  Alive   • Huey  Alive       Patient Active Problem List    Diagnosis Date Noted   • Encounter to establish care 05/21/2020   • Fleshy skin mole 05/21/2020   • History of urinary tract infection 06/11/2019   • Hyperkeratosis of sole 02/12/2019   • Generalized anxiety disorder 11/09/2018   • Social anxiety disorder 11/09/2018   • Personality disorder in adult (HCC) 11/09/2018   • Plantar warts 11/09/2018   • Pain of breast during breastfeeding 07/10/2018   • Rash 07/10/2018   • Microhematuria 05/16/2018   • Major depressive disorder 05/16/2018   • Dysthymia 04/11/2018   • Attention deficit hyperactivity disorder, predominantly inattentive type 04/11/2018   • Dyslipidemia 04/11/2018          Objective:     /74 (BP Location: Right arm, Patient Position: Sitting, BP Cuff Size: Adult)   Pulse 71   Temp 37 °C (98.6 °F) (Temporal)   Ht 1.803 m (5' 11\")   Wt 98.4 kg (216 lb 14.4 oz)   SpO2 95%  Body mass index is 30.25 kg/m².    Physical Exam:  Physical Exam   Constitutional: She is oriented to person, place, and time and well-developed, well-nourished, and in no distress. No distress.   HENT:   Head: Normocephalic.   Right Ear: Tympanic membrane and external ear normal.   Left Ear: Tympanic membrane and external ear normal.   Eyes: Pupils are equal, round, and reactive to light. Conjunctivae and EOM are normal.   Neck: Normal range of motion. Neck supple. No tracheal deviation present.   Cardiovascular: Normal rate, regular rhythm, normal heart sounds and intact distal pulses.   Pulmonary/Chest: Effort normal and breath sounds normal.   Abdominal: Soft. Bowel sounds are normal.   Musculoskeletal: Normal range of motion.   Lymphadenopathy:        Head (right side): No preauricular adenopathy present.        Head (left side): No preauricular adenopathy present.     She has no cervical adenopathy.   Neurological: She is alert and oriented " to person, place, and time. She has normal sensation, normal strength and intact cranial nerves. Gait normal.   Skin: Skin is warm and dry.        Small plantar wart on the ball of her left foot.   Psychiatric: Affect and judgment normal.     CRYOTHERAPY:  Discussed risks and benefits of cryotherapy. Patient verbally agreed. 3 applications of cryotherapy were applied to viral wart lesion on dorsal aspect of left foot. Patient tolerated procedure well. Aftercare instructions given.       Assessment and Plan:     The following treatment plan was discussed:    1. Plantar warts   chronic problem, improving but not resolved.  Small remnant of a previously larger plantar wart was treated with cryotherapy today.  Patient notified to return if it does not resolve after this cryotherapy session.     2. Fleshy skin mole   chronic problem, unstable.  This mole is bothersome and although it does not present with any concerning signs she would like it removed.  We will schedule her for a punch biopsy removal in the future.   3. Encounter to establish care     4. Screening for metabolic disorder  HEMOGLOBIN A1C    TSH WITH REFLEX TO FT4   5. Encounter for vitamin deficiency screening  VITAMIN D,25 HYDROXY   6. Routine screening for STI (sexually transmitted infection)  HIV AG/AB COMBO ASSAY SCREENING    T.PALLIDUM AB EIA    Chlamydia/GC PCR Urine Or Swab   7. Screening for cardiovascular condition  Lipid Profile       Any change or worsening of signs or symptoms, patient encouraged to follow-up or report to emergency room for further evaluation. Patient verbalizes understanding and agrees.    Follow-Up: Return for To be determined by lab results..      PLEASE NOTE: This dictation was created using voice recognition software. I have made every reasonable attempt to correct obvious errors, but I expect that there are errors of grammar and possibly content that I did not discover before finalizing the note.

## 2020-05-21 NOTE — ASSESSMENT & PLAN NOTE
Present for as long as she can remember.  It is located on her mid back to the right of spine.  It has been bothering her for a couple months, it has been swollen and catching on things.  Denies drainage.

## 2020-05-21 NOTE — ASSESSMENT & PLAN NOTE
On the sole of her left foot, she has struggled with plantar wart for years.  She has been able to get the size down, but not completely gone.  It is not painful as it is smaller.  Denies drainage.

## 2020-06-01 ENCOUNTER — TELEPHONE (OUTPATIENT)
Dept: MEDICAL GROUP | Facility: MEDICAL CENTER | Age: 35
End: 2020-06-01

## 2020-06-01 NOTE — TELEPHONE ENCOUNTER
Lab need new test code order for T.PALLIDUM AB EIA. The code (13129) it's for 1 year to 5 year old, not for adult. The test code for adult is (4112).

## 2020-06-09 NOTE — RESULT ENCOUNTER NOTE
"Galdino Rosenberg,      I got most of your lab results, unfortunately there was an error in the lab with your syphilis test so we will repeat this again next time you get your labs drawn.  You do not have diabetes.  Your thyroid function looks normal.  You are negative for chlamydia, gonorrhea, and HIV.  Your vitamin D level is a little bit low, I like to see you take a vitamin D 3 supplement, 2000 units daily.  Unfortunately Medicaid does not pay for this but it is available over-the-counter.      Your cholesterol is somewhat elevated.  Your LDL, or \"bad cholesterol\" is elevated at 129, we would like this below 100.  You can improve this by increasing physical exercise and decreasing saturated fat in your diet, especially from beef and pork sources.  We will recheck your cholesterol in about 4 months to see how much your lifestyle changes have improved your numbers.  Let me know if you have any questions or concerns, we can also talk little bit more about this at your appointment on 6/15.  Genoveva"

## 2020-06-15 ENCOUNTER — OFFICE VISIT (OUTPATIENT)
Dept: MEDICAL GROUP | Facility: MEDICAL CENTER | Age: 35
End: 2020-06-15
Attending: NURSE PRACTITIONER
Payer: MEDICAID

## 2020-06-15 VITALS
DIASTOLIC BLOOD PRESSURE: 64 MMHG | HEART RATE: 63 BPM | WEIGHT: 218.2 LBS | BODY MASS INDEX: 30.55 KG/M2 | TEMPERATURE: 97.9 F | HEIGHT: 71 IN | SYSTOLIC BLOOD PRESSURE: 102 MMHG | OXYGEN SATURATION: 94 %

## 2020-06-15 DIAGNOSIS — B07.0 PLANTAR WART: ICD-10-CM

## 2020-06-15 PROCEDURE — 99213 OFFICE O/P EST LOW 20 MIN: CPT | Performed by: NURSE PRACTITIONER

## 2020-06-15 ASSESSMENT — ENCOUNTER SYMPTOMS
COUGH: 0
BLOOD IN STOOL: 0
WHEEZING: 0
WEIGHT LOSS: 0
ABDOMINAL PAIN: 0
CONSTIPATION: 0
FEVER: 0
CHILLS: 0
PALPITATIONS: 0
DIARRHEA: 0
SHORTNESS OF BREATH: 0

## 2020-06-15 NOTE — ASSESSMENT & PLAN NOTE
Patient presents today for removal of suspected mole. Present for as long as she can remember.  It is located on her mid back to the right of spine.  It has been bothering her for a couple months, it has been swollen and catching on things.  Denies drainage.

## 2020-06-15 NOTE — PROGRESS NOTES
Chief Complaint   Patient presents with   • Warts       Subjective:     HPI:   Shakira Pete is a 35 y.o. female here to discuss the evaluation and management of:        Plantar warts  Pt reports a slight increase in size of her plantar on her left foot since out last cryo session, she would like cryo therapy today.       ROS  Review of Systems   Constitutional: Negative for chills, fever, malaise/fatigue and weight loss.   Respiratory: Negative for cough, shortness of breath and wheezing.    Cardiovascular: Negative for chest pain, palpitations and leg swelling.   Gastrointestinal: Negative for abdominal pain, blood in stool, constipation and diarrhea.         No Known Allergies    Current medicines (including changes today)  No current outpatient medications on file.     No current facility-administered medications for this visit.        Social History     Tobacco Use   • Smoking status: Never Smoker   • Smokeless tobacco: Never Used   Substance Use Topics   • Alcohol use: Yes     Comment: rarely   • Drug use: No       Patient Active Problem List    Diagnosis Date Noted   • Encounter to establish care 05/21/2020   • Fleshy skin mole 05/21/2020   • History of urinary tract infection 06/11/2019   • Hyperkeratosis of sole 02/12/2019   • Generalized anxiety disorder 11/09/2018   • Social anxiety disorder 11/09/2018   • Personality disorder in adult (HCC) 11/09/2018   • Plantar warts 11/09/2018   • Pain of breast during breastfeeding 07/10/2018   • Rash 07/10/2018   • Microhematuria 05/16/2018   • Major depressive disorder 05/16/2018   • Dysthymia 04/11/2018   • Attention deficit hyperactivity disorder, predominantly inattentive type 04/11/2018   • Dyslipidemia 04/11/2018       Family History   Problem Relation Age of Onset   • Depression Mother    • Depression Sister    • Depression Sister    • Other Daughter         chronic constipation, fecal impaction as an infant   • No Known Problems Daughter    • Pulmonary  "Embolism Paternal Grandmother           Objective:     /64 (BP Location: Left arm, Patient Position: Sitting, BP Cuff Size: Adult)   Pulse 63   Temp 36.6 °C (97.9 °F) (Temporal)   Ht 1.803 m (5' 11\")   Wt 99 kg (218 lb 3.2 oz)   SpO2 94%  Body mass index is 30.43 kg/m².    Physical Exam:  Physical Exam   Constitutional: She is oriented to person, place, and time and well-developed, well-nourished, and in no distress. No distress.   HENT:   Head: Normocephalic.   Right Ear: Tympanic membrane and external ear normal.   Left Ear: Tympanic membrane and external ear normal.   Eyes: Pupils are equal, round, and reactive to light. Conjunctivae and EOM are normal.   Neck: Normal range of motion. Neck supple. No tracheal deviation present.   Cardiovascular: Normal rate, regular rhythm, normal heart sounds and intact distal pulses.   Pulmonary/Chest: Effort normal and breath sounds normal.   Abdominal: Soft. Bowel sounds are normal.   Musculoskeletal: Normal range of motion.   Lymphadenopathy:        Head (right side): No preauricular adenopathy present.        Head (left side): No preauricular adenopathy present.     She has no cervical adenopathy.   Neurological: She is alert and oriented to person, place, and time. She has normal sensation, normal strength and intact cranial nerves. Gait normal.   Skin: Skin is warm and dry.   Plantar wart, left plantar aspect of foot.   Psychiatric: Affect and judgment normal.         CRYOTHERAPY:  Discussed risks and benefits of cryotherapy. Patient verbally agreed. 3 applications of cryotherapy were applied to plantar lesion on left foot. Patient tolerated procedure well. Aftercare instructions given.      Assessment and Plan:     The following treatment plan was discussed:    1. Plantar warts  - Chronic problem, unstable. See cryo note above.  Return if lesion not destroyed.       Any change or worsening of signs or symptoms, patient encouraged to follow-up or report to " emergency room for further evaluation. Patient verbalizes understanding and agrees.    Follow-Up: Return Lesion not destroyed after this cryotherapy session..      PLEASE NOTE: This dictation was created using voice recognition software. I have made every reasonable attempt to correct obvious errors, but I expect that there are errors of grammar and possibly content that I did not discover before finalizing the note.

## 2020-06-15 NOTE — ASSESSMENT & PLAN NOTE
Pt reports a slight increase in size of her plantar on her left foot since out last cryo session, she would like cryo therapy today.

## 2020-10-02 ENCOUNTER — HOSPITAL ENCOUNTER (OUTPATIENT)
Facility: MEDICAL CENTER | Age: 35
End: 2020-10-02
Attending: SPECIALIST
Payer: MEDICAID

## 2020-10-06 LAB
FORWARD REASON: SPWHY: NORMAL
FORWARDED TO LAB: SPWHR: NORMAL
SPECIMEN SENT: SPWT1: NORMAL

## 2020-12-23 ENCOUNTER — TELEPHONE (OUTPATIENT)
Dept: MEDICAL GROUP | Facility: MEDICAL CENTER | Age: 35
End: 2020-12-23

## 2020-12-23 DIAGNOSIS — R31.0 HEMATURIA, GROSS: ICD-10-CM

## 2020-12-23 NOTE — TELEPHONE ENCOUNTER
Phone Number Called    Call outcome: Spoke to patient regarding message below.    Message: Pt. States that she would like a referral to [facility] with Dr. Pineda for her hematuria. Pt states [facility] needs a follow up, but requires a referral from her PCP beforehand. Her appt is next Tuesday and would need it by then.    Referral to uro sent for heamaturia. (per Genoveva)

## 2021-02-03 ENCOUNTER — OFFICE VISIT (OUTPATIENT)
Dept: MEDICAL GROUP | Facility: MEDICAL CENTER | Age: 36
End: 2021-02-03
Attending: NURSE PRACTITIONER
Payer: MEDICAID

## 2021-02-03 VITALS
TEMPERATURE: 97 F | DIASTOLIC BLOOD PRESSURE: 60 MMHG | RESPIRATION RATE: 16 BRPM | HEIGHT: 70 IN | OXYGEN SATURATION: 96 % | HEART RATE: 68 BPM | BODY MASS INDEX: 31.68 KG/M2 | SYSTOLIC BLOOD PRESSURE: 100 MMHG | WEIGHT: 221.3 LBS

## 2021-02-03 DIAGNOSIS — Z13.21 ENCOUNTER FOR VITAMIN DEFICIENCY SCREENING: ICD-10-CM

## 2021-02-03 DIAGNOSIS — Z13.6 SCREENING FOR CARDIOVASCULAR CONDITION: ICD-10-CM

## 2021-02-03 DIAGNOSIS — E66.9 OBESITY (BMI 30-39.9): ICD-10-CM

## 2021-02-03 DIAGNOSIS — Z13.228 SCREENING FOR METABOLIC DISORDER: ICD-10-CM

## 2021-02-03 DIAGNOSIS — Z11.3 ROUTINE SCREENING FOR STI (SEXUALLY TRANSMITTED INFECTION): ICD-10-CM

## 2021-02-03 DIAGNOSIS — B07.0 PLANTAR WART: ICD-10-CM

## 2021-02-03 PROCEDURE — 17110 DESTRUCTION B9 LES UP TO 14: CPT | Performed by: NURSE PRACTITIONER

## 2021-02-03 PROCEDURE — 17110 DESTRUCTION B9 LES UP TO 14: CPT

## 2021-02-03 PROCEDURE — 99212 OFFICE O/P EST SF 10 MIN: CPT | Performed by: NURSE PRACTITIONER

## 2021-02-03 ASSESSMENT — ENCOUNTER SYMPTOMS
CONSTIPATION: 0
PALPITATIONS: 0
WEIGHT LOSS: 0
ABDOMINAL PAIN: 0
SHORTNESS OF BREATH: 0
DIARRHEA: 0
FEVER: 0
BLOOD IN STOOL: 0
CHILLS: 0
WHEEZING: 0
COUGH: 0

## 2021-02-03 NOTE — PROGRESS NOTES
Chief Complaint   Patient presents with   • Warts     foot       Subjective:     HPI:   Shakira Pete is a 35 y.o. female here to discuss the evaluation and management of:        Plantar warts  Pt reports small persistent lesion on left foot, she is requesting another cryo therapy today.      ROS  Review of Systems   Constitutional: Negative for chills, fever, malaise/fatigue and weight loss.   Respiratory: Negative for cough, shortness of breath and wheezing.    Cardiovascular: Negative for chest pain, palpitations and leg swelling.   Gastrointestinal: Negative for abdominal pain, blood in stool, constipation and diarrhea.         No Known Allergies    Current medicines (including changes today)  No current outpatient medications on file.     No current facility-administered medications for this visit.        Social History     Tobacco Use   • Smoking status: Never Smoker   • Smokeless tobacco: Never Used   Substance Use Topics   • Alcohol use: Yes     Comment: rarely   • Drug use: No       Patient Active Problem List    Diagnosis Date Noted   • Obesity (BMI 30-39.9) 02/03/2021   • Encounter to establish care 05/21/2020   • Fleshy skin mole 05/21/2020   • History of urinary tract infection 06/11/2019   • Hyperkeratosis of sole 02/12/2019   • Generalized anxiety disorder 11/09/2018   • Social anxiety disorder 11/09/2018   • Personality disorder in adult (HCC) 11/09/2018   • Plantar warts 11/09/2018   • Pain of breast during breastfeeding 07/10/2018   • Rash 07/10/2018   • Microhematuria 05/16/2018   • Major depressive disorder 05/16/2018   • Dysthymia 04/11/2018   • Attention deficit hyperactivity disorder, predominantly inattentive type 04/11/2018   • Dyslipidemia 04/11/2018       Family History   Problem Relation Age of Onset   • Depression Mother    • Depression Sister    • Depression Sister    • Other Daughter         chronic constipation, fecal impaction as an infant   • No Known Problems Daughter    •  "Pulmonary Embolism Paternal Grandmother           Objective:     /60 (BP Location: Left arm, Patient Position: Sitting, BP Cuff Size: Adult)   Pulse 68   Temp 36.1 °C (97 °F) (Temporal)   Resp 16   Ht 1.778 m (5' 10\")   Wt 100 kg (221 lb 4.8 oz)   SpO2 96%  Body mass index is 31.75 kg/m².    Physical Exam:  Physical Exam   Constitutional: She is oriented to person, place, and time and well-developed, well-nourished, and in no distress. No distress.   HENT:   Head: Normocephalic.   Right Ear: Tympanic membrane and external ear normal.   Left Ear: Tympanic membrane and external ear normal.   Eyes: Pupils are equal, round, and reactive to light. Conjunctivae and EOM are normal.   Neck: Normal range of motion. Neck supple. No tracheal deviation present.   Cardiovascular: Normal rate, regular rhythm, normal heart sounds and intact distal pulses.   Pulmonary/Chest: Effort normal and breath sounds normal.   Abdominal: Soft. Bowel sounds are normal.   Musculoskeletal: Normal range of motion.   Lymphadenopathy:        Head (right side): No preauricular adenopathy present.        Head (left side): No preauricular adenopathy present.     She has no cervical adenopathy.   Neurological: She is alert and oriented to person, place, and time. She has normal sensation, normal strength and intact cranial nerves. Gait normal.   Skin: Skin is warm and dry. Lesion noted.   1 cm lesion on left foot consistent plantar wart, no arlet lesion erythema   Psychiatric: Affect and judgment normal.       CRYOTHERAPY:  Discussed risks and benefits of cryotherapy. Patient verbally agreed. 3 applications of cryotherapy were applied to viral wart lesion on left foot. Patient tolerated procedure well. Aftercare instructions given.      Assessment and Plan:     The following treatment plan was discussed:    1. Plantar warts  - chronic problem unstable. Cryotherapy applied see note above   2. Obesity (BMI 30-39.9)  Patient identified as " having weight management issue.  Appropriate orders and counseling given.   3. Screening for metabolic disorder  HEMOGLOBIN A1C    TSH WITH REFLEX TO FT4   4. Screening for cardiovascular condition  Lipid Profile   5. Encounter for vitamin deficiency screening  VITAMIN D,25 HYDROXY   6. Routine screening for STI (sexually transmitted infection)  HIV AG/AB COMBO ASSAY SCREENING    Chlamydia/GC PCR Urine Or Swab    RPR (SYPHILIS)       Any change or worsening of signs or symptoms, patient encouraged to follow-up or report to emergency room for further evaluation. Patient verbalizes understanding and agrees.    Follow-Up: Return if symptoms worsen or fail to improve.      PLEASE NOTE: This dictation was created using voice recognition software. I have made every reasonable attempt to correct obvious errors, but I expect that there are errors of grammar and possibly content that I did not discover before finalizing the note.

## 2021-04-21 ENCOUNTER — OFFICE VISIT (OUTPATIENT)
Dept: MEDICAL GROUP | Facility: MEDICAL CENTER | Age: 36
End: 2021-04-21
Attending: NURSE PRACTITIONER
Payer: MEDICAID

## 2021-04-21 VITALS
OXYGEN SATURATION: 96 % | HEART RATE: 76 BPM | TEMPERATURE: 96.4 F | SYSTOLIC BLOOD PRESSURE: 108 MMHG | DIASTOLIC BLOOD PRESSURE: 68 MMHG | WEIGHT: 221.5 LBS | HEIGHT: 70 IN | BODY MASS INDEX: 31.71 KG/M2 | RESPIRATION RATE: 16 BRPM

## 2021-04-21 DIAGNOSIS — B07.0 PLANTAR WART: ICD-10-CM

## 2021-04-21 PROCEDURE — 99212 OFFICE O/P EST SF 10 MIN: CPT | Mod: 25 | Performed by: NURSE PRACTITIONER

## 2021-04-21 PROCEDURE — 17110 DESTRUCTION B9 LES UP TO 14: CPT

## 2021-04-21 PROCEDURE — 17110 DESTRUCTION B9 LES UP TO 14: CPT | Performed by: NURSE PRACTITIONER

## 2021-04-21 PROCEDURE — 99212 OFFICE O/P EST SF 10 MIN: CPT | Performed by: NURSE PRACTITIONER

## 2021-04-21 ASSESSMENT — ENCOUNTER SYMPTOMS
FEVER: 0
BLOOD IN STOOL: 0
COUGH: 0
SHORTNESS OF BREATH: 0
CONSTIPATION: 0
WEIGHT LOSS: 0
WHEEZING: 0
DIARRHEA: 0
PALPITATIONS: 0
CHILLS: 0
ABDOMINAL PAIN: 0

## 2021-04-21 NOTE — PROGRESS NOTES
Chief Complaint   Patient presents with   • Warts     Ball of left foot       Subjective:     HPI:   Shakira Pete is a 36 y.o. female here to discuss the evaluation and management of:        Plantar warts  She continues to have a plantar wart lesion on the plantar aspect of her left food. She did not have any concerns after her last cryo session.  Denies ulceration and redness.       ROS  Review of Systems   Constitutional: Negative for chills, fever, malaise/fatigue and weight loss.   Respiratory: Negative for cough, shortness of breath and wheezing.    Cardiovascular: Negative for chest pain, palpitations and leg swelling.   Gastrointestinal: Negative for abdominal pain, blood in stool, constipation and diarrhea.         No Known Allergies    Current medicines (including changes today)  No current outpatient medications on file.     No current facility-administered medications for this visit.       Social History     Tobacco Use   • Smoking status: Never Smoker   • Smokeless tobacco: Never Used   Substance Use Topics   • Alcohol use: Yes     Comment: rarely   • Drug use: No       Patient Active Problem List    Diagnosis Date Noted   • Obesity (BMI 30-39.9) 02/03/2021   • Encounter to establish care 05/21/2020   • Fleshy skin mole 05/21/2020   • History of urinary tract infection 06/11/2019   • Hyperkeratosis of sole 02/12/2019   • Generalized anxiety disorder 11/09/2018   • Social anxiety disorder 11/09/2018   • Personality disorder in adult (HCC) 11/09/2018   • Plantar warts 11/09/2018   • Pain of breast during breastfeeding 07/10/2018   • Rash 07/10/2018   • Microhematuria 05/16/2018   • Major depressive disorder 05/16/2018   • Dysthymia 04/11/2018   • Attention deficit hyperactivity disorder, predominantly inattentive type 04/11/2018   • Dyslipidemia 04/11/2018       Family History   Problem Relation Age of Onset   • Depression Mother    • Depression Sister    • Depression Sister    • Other Daughter         " chronic constipation, fecal impaction as an infant   • No Known Problems Daughter    • Pulmonary Embolism Paternal Grandmother           Objective:     /68 (BP Location: Left arm, Patient Position: Sitting, BP Cuff Size: Adult)   Pulse 76   Temp (!) 35.8 °C (96.4 °F) (Temporal)   Resp 16   Ht 1.778 m (5' 10\")   Wt 100 kg (221 lb 8 oz)   SpO2 96%  Body mass index is 31.78 kg/m².    Physical Exam:  Physical Exam   Constitutional: She is oriented to person, place, and time and well-developed, well-nourished, and in no distress. No distress.   HENT:   Head: Normocephalic.   Right Ear: Tympanic membrane and external ear normal.   Left Ear: Tympanic membrane and external ear normal.   Eyes: Pupils are equal, round, and reactive to light. Conjunctivae and EOM are normal.   Neck: No tracheal deviation present.   Cardiovascular: Normal rate, regular rhythm, normal heart sounds and intact distal pulses.   Pulmonary/Chest: Effort normal and breath sounds normal.   Abdominal: Soft. Bowel sounds are normal.   Musculoskeletal:         General: Normal range of motion.      Cervical back: Normal range of motion and neck supple.   Lymphadenopathy:        Head (right side): No preauricular adenopathy present.        Head (left side): No preauricular adenopathy present.     She has no cervical adenopathy.   Neurological: She is alert and oriented to person, place, and time. She has normal sensation, normal strength and intact cranial nerves. Gait normal.   Skin: Skin is warm and dry.   1 cm flat lesion visually consistent with plantar wart   Psychiatric: Affect and judgment normal.     CRYOTHERAPY:  Discussed risks and benefits of cryotherapy. Patient verbally agreed. 3 applications of cryotherapy were applied to plantar wart lesion on left foot. Patient tolerated procedure well. Aftercare instructions given.    Assessment and Plan:     The following treatment plan was discussed:    1. Plantar warts  -chronic problem, " unstable.  Cryotherapy as above.       Any change or worsening of signs or symptoms, patient encouraged to follow-up or report to emergency room for further evaluation. Patient verbalizes understanding and agrees.    Follow-Up: Return in about 3 weeks (around 5/12/2021) for Cryotherapy.      PLEASE NOTE: This dictation was created using voice recognition software. I have made every reasonable attempt to correct obvious errors, but I expect that there are errors of grammar and possibly content that I did not discover before finalizing the note.

## 2021-04-21 NOTE — ASSESSMENT & PLAN NOTE
She continues to have a plantar wart lesion on the plantar aspect of her left food. She did not have any concerns after her last cryo session.  Denies ulceration and redness.

## 2021-05-25 ENCOUNTER — OFFICE VISIT (OUTPATIENT)
Dept: MEDICAL GROUP | Facility: MEDICAL CENTER | Age: 36
End: 2021-05-25
Attending: NURSE PRACTITIONER
Payer: MEDICAID

## 2021-05-25 VITALS
HEIGHT: 70 IN | HEART RATE: 75 BPM | DIASTOLIC BLOOD PRESSURE: 68 MMHG | BODY MASS INDEX: 31.64 KG/M2 | OXYGEN SATURATION: 96 % | WEIGHT: 221 LBS | RESPIRATION RATE: 14 BRPM | TEMPERATURE: 96.6 F | SYSTOLIC BLOOD PRESSURE: 98 MMHG

## 2021-05-25 DIAGNOSIS — B07.0 PLANTAR WART: ICD-10-CM

## 2021-05-25 PROCEDURE — 99212 OFFICE O/P EST SF 10 MIN: CPT | Performed by: NURSE PRACTITIONER

## 2021-05-25 PROCEDURE — 17110 DESTRUCTION B9 LES UP TO 14: CPT | Performed by: NURSE PRACTITIONER

## 2021-05-25 ASSESSMENT — ENCOUNTER SYMPTOMS
CHILLS: 0
CONSTIPATION: 0
COUGH: 0
WHEEZING: 0
PALPITATIONS: 0
DIARRHEA: 0
FEVER: 0
SHORTNESS OF BREATH: 0
BLOOD IN STOOL: 0
ABDOMINAL PAIN: 0
WEIGHT LOSS: 0

## 2021-05-25 NOTE — ASSESSMENT & PLAN NOTE
Patient present for cryo therapy of her plantar wart on her left plantar aspect.  She denies ulceration and drainage.

## 2021-05-25 NOTE — PROGRESS NOTES
No chief complaint on file.      Subjective:     HPI:   Shakira Pete is a 36 y.o. female here to discuss the evaluation and management of:        Plantar warts  Patient present for cryo therapy of her plantar wart on her left plantar aspect.  She denies ulceration and drainage.        ROS  Review of Systems   Constitutional: Positive for malaise/fatigue. Negative for chills, fever and weight loss.   Respiratory: Negative for cough, shortness of breath and wheezing.    Cardiovascular: Negative for chest pain, palpitations and leg swelling.   Gastrointestinal: Negative for abdominal pain, blood in stool, constipation and diarrhea.         No Known Allergies    Current medicines (including changes today)  No current outpatient medications on file.     No current facility-administered medications for this visit.       Social History     Tobacco Use   • Smoking status: Never Smoker   • Smokeless tobacco: Never Used   Substance Use Topics   • Alcohol use: Yes     Comment: rarely   • Drug use: No       Patient Active Problem List    Diagnosis Date Noted   • Obesity (BMI 30-39.9) 02/03/2021   • Encounter to establish care 05/21/2020   • Fleshy skin mole 05/21/2020   • History of urinary tract infection 06/11/2019   • Hyperkeratosis of sole 02/12/2019   • Generalized anxiety disorder 11/09/2018   • Social anxiety disorder 11/09/2018   • Personality disorder in adult (HCC) 11/09/2018   • Plantar warts 11/09/2018   • Pain of breast during breastfeeding 07/10/2018   • Rash 07/10/2018   • Microhematuria 05/16/2018   • Major depressive disorder 05/16/2018   • Dysthymia 04/11/2018   • Attention deficit hyperactivity disorder, predominantly inattentive type 04/11/2018   • Dyslipidemia 04/11/2018       Family History   Problem Relation Age of Onset   • Depression Mother    • Depression Sister    • Depression Sister    • Other Daughter         chronic constipation, fecal impaction as an infant   • No Known Problems Daughter    •  Pulmonary Embolism Paternal Grandmother           Objective:     There were no vitals taken for this visit. There is no height or weight on file to calculate BMI.    Physical Exam:  Physical Exam  Constitutional:       General: She is not in acute distress.  HENT:      Head: Normocephalic.      Right Ear: Tympanic membrane and external ear normal.      Left Ear: Tympanic membrane and external ear normal.   Eyes:      Conjunctiva/sclera: Conjunctivae normal.      Pupils: Pupils are equal, round, and reactive to light.   Neck:      Trachea: No tracheal deviation.   Cardiovascular:      Rate and Rhythm: Normal rate and regular rhythm.      Heart sounds: Normal heart sounds.   Pulmonary:      Effort: Pulmonary effort is normal.      Breath sounds: Normal breath sounds.   Abdominal:      General: Bowel sounds are normal.      Palpations: Abdomen is soft.   Musculoskeletal:         General: Normal range of motion.      Cervical back: Normal range of motion and neck supple.   Lymphadenopathy:      Head:      Right side of head: No preauricular adenopathy.      Left side of head: No preauricular adenopathy.      Cervical: No cervical adenopathy.   Skin:     General: Skin is warm and dry.      Findings: Lesion present.      Comments: Plantar wart plantar aspect of left foot.   Neurological:      Mental Status: She is alert and oriented to person, place, and time.      Cranial Nerves: Cranial nerves are intact.      Sensory: Sensation is intact.      Gait: Gait is intact.   Psychiatric:         Mood and Affect: Affect normal.         Judgment: Judgment normal.       CRYOTHERAPY:  Discussed risks and benefits of cryotherapy. Patient verbally agreed. 3 applications of cryotherapy were applied to plantar wart lesion on left foot. Patient tolerated procedure well. Aftercare instructions given.      Assessment and Plan:     The following treatment plan was discussed:    1. Plantar warts  REFERRAL TO PODIATRY    REFERRAL TO  DERMATOLOGY  -Chronic problem, improving.  See cryotherapy note above.  This plantar wart is particularly stubborn therefore I placed referrals to dermatology and podiatry to see if we can get resolution for the patient.       Any change or worsening of signs or symptoms, patient encouraged to follow-up or report to emergency room for further evaluation. Patient verbalizes understanding and agrees.    Follow-Up: Return if symptoms worsen or fail to improve.      PLEASE NOTE: This dictation was created using voice recognition software. I have made every reasonable attempt to correct obvious errors, but I expect that there are errors of grammar and possibly content that I did not discover before finalizing the note.

## 2021-07-01 NOTE — RESULT ENCOUNTER NOTE
"Galdino Rosenberg-       I got your lab results.  Your cholesterol (LDL-\"bad cholesterol\") is very mildly elevated at 110, we would like this below 100.  You have improved your cholesterol since last year, however increasing exercise and decreasing saturated fats in your diet can improve this further.  Saturated fats are generally solid at room temperature like cheese, butter, coconut oil, and, the fat on meat.  You do not have diabetes.  Your thyroid function is normal.  You are negative for gonorrhea, chlamydia, and HIV.  Your vitamin D is on the low side of normal, I recommend taking over-the-counter vitamin D3 supplement-2000 units daily.  Please let me know if you have any questions or concerns.Genoveva"

## 2021-11-30 ENCOUNTER — OFFICE VISIT (OUTPATIENT)
Dept: URGENT CARE | Facility: CLINIC | Age: 36
End: 2021-11-30
Payer: MEDICAID

## 2021-11-30 VITALS
SYSTOLIC BLOOD PRESSURE: 120 MMHG | HEART RATE: 86 BPM | BODY MASS INDEX: 32.78 KG/M2 | RESPIRATION RATE: 14 BRPM | DIASTOLIC BLOOD PRESSURE: 60 MMHG | WEIGHT: 229 LBS | TEMPERATURE: 97.8 F | HEIGHT: 70 IN | OXYGEN SATURATION: 97 %

## 2021-11-30 DIAGNOSIS — J02.9 PHARYNGITIS, UNSPECIFIED ETIOLOGY: ICD-10-CM

## 2021-11-30 DIAGNOSIS — Z20.818 EXPOSURE TO STREP THROAT: Primary | ICD-10-CM

## 2021-11-30 PROCEDURE — 99213 OFFICE O/P EST LOW 20 MIN: CPT | Performed by: NURSE PRACTITIONER

## 2021-11-30 RX ORDER — AMOXICILLIN 500 MG/1
500 CAPSULE ORAL 2 TIMES DAILY
Qty: 20 CAPSULE | Refills: 0 | Status: SHIPPED | OUTPATIENT
Start: 2021-11-30 | End: 2021-12-10

## 2021-11-30 ASSESSMENT — ENCOUNTER SYMPTOMS
CHILLS: 0
ABDOMINAL PAIN: 0
NAUSEA: 0
FEVER: 0
TROUBLE SWALLOWING: 1
COUGH: 1
SORE THROAT: 1
VOMITING: 0
HEADACHES: 1
MYALGIAS: 0
SWOLLEN GLANDS: 1
DIARRHEA: 0

## 2021-12-01 NOTE — PROGRESS NOTES
Subjective:     Shakira Pete is a 36 y.o. female who presents for Sore Throat (cough, kids pos strep. x 3 days)      Pharyngitis   This is a new problem. The current episode started in the past 7 days (Three days ago Shakira developed a sore throat, cough, congestion and headache). The problem has been unchanged. Neither side of throat is experiencing more pain than the other. There has been no fever. Associated symptoms include congestion, coughing, headaches, swollen glands and trouble swallowing. Pertinent negatives include no abdominal pain, diarrhea or vomiting. She has had exposure to strep. Exposure to: Both of her children tested positive for strep. She has tried NSAIDs for the symptoms. The treatment provided mild relief.         Review of Systems   Constitutional: Positive for malaise/fatigue. Negative for chills and fever.   HENT: Positive for congestion, sore throat and trouble swallowing.    Respiratory: Positive for cough.    Gastrointestinal: Negative for abdominal pain, diarrhea, nausea and vomiting.   Musculoskeletal: Negative for myalgias.   Neurological: Positive for headaches.       PMH:   Past Medical History:   Diagnosis Date   • ADHD    • Anxiety    • Depression    • Migraine      ALLERGIES: No Known Allergies  SURGHX:   Past Surgical History:   Procedure Laterality Date   • DENTAL EXTRACTION(S)       SOCHX:   Social History     Socioeconomic History   • Marital status:      Spouse name: Not on file   • Number of children: Not on file   • Years of education: Not on file   • Highest education level: Not on file   Occupational History   • Not on file   Tobacco Use   • Smoking status: Never Smoker   • Smokeless tobacco: Never Used   Substance and Sexual Activity   • Alcohol use: Yes     Comment: rarely   • Drug use: No   • Sexual activity: Not on file   Other Topics Concern   • Not on file   Social History Narrative   • Not on file     Social Determinants of Health     Financial  "Resource Strain:    • Difficulty of Paying Living Expenses: Not on file   Food Insecurity:    • Worried About Running Out of Food in the Last Year: Not on file   • Ran Out of Food in the Last Year: Not on file   Transportation Needs:    • Lack of Transportation (Medical): Not on file   • Lack of Transportation (Non-Medical): Not on file   Physical Activity:    • Days of Exercise per Week: Not on file   • Minutes of Exercise per Session: Not on file   Stress:    • Feeling of Stress : Not on file   Social Connections:    • Frequency of Communication with Friends and Family: Not on file   • Frequency of Social Gatherings with Friends and Family: Not on file   • Attends Jainism Services: Not on file   • Active Member of Clubs or Organizations: Not on file   • Attends Club or Organization Meetings: Not on file   • Marital Status: Not on file   Intimate Partner Violence:    • Fear of Current or Ex-Partner: Not on file   • Emotionally Abused: Not on file   • Physically Abused: Not on file   • Sexually Abused: Not on file   Housing Stability:    • Unable to Pay for Housing in the Last Year: Not on file   • Number of Places Lived in the Last Year: Not on file   • Unstable Housing in the Last Year: Not on file     FH:   Family History   Problem Relation Age of Onset   • Depression Mother    • Depression Sister    • Depression Sister    • Other Daughter         chronic constipation, fecal impaction as an infant   • No Known Problems Daughter    • Pulmonary Embolism Paternal Grandmother          Objective:   /60   Pulse 86   Temp 36.6 °C (97.8 °F)   Resp 14   Ht 1.778 m (5' 10\")   Wt 104 kg (229 lb)   SpO2 97%   BMI 32.86 kg/m²     Physical Exam  Vitals and nursing note reviewed.   Constitutional:       General: She is not in acute distress.     Appearance: Normal appearance. She is normal weight. She is not ill-appearing.   HENT:      Head: Normocephalic and atraumatic.      Right Ear: There is impacted cerumen. "      Left Ear: There is impacted cerumen.      Nose: No congestion or rhinorrhea.      Mouth/Throat:      Mouth: Mucous membranes are moist.      Pharynx: Uvula midline. Posterior oropharyngeal erythema present. No pharyngeal swelling, oropharyngeal exudate or uvula swelling.      Tonsils: No tonsillar exudate or tonsillar abscesses. 1+ on the right. 1+ on the left.   Eyes:      Extraocular Movements: Extraocular movements intact.      Pupils: Pupils are equal, round, and reactive to light.   Cardiovascular:      Rate and Rhythm: Normal rate and regular rhythm.      Pulses: Normal pulses.      Heart sounds: Normal heart sounds.   Pulmonary:      Effort: Pulmonary effort is normal. No respiratory distress.      Breath sounds: Normal breath sounds. No stridor. No wheezing, rhonchi or rales.   Chest:      Chest wall: No tenderness.   Abdominal:      General: Abdomen is flat. Bowel sounds are normal.      Palpations: Abdomen is soft.      Tenderness: There is no abdominal tenderness. There is no right CVA tenderness or left CVA tenderness.   Musculoskeletal:         General: Normal range of motion.      Cervical back: Normal range of motion and neck supple. No tenderness.   Lymphadenopathy:      Cervical: No cervical adenopathy.   Skin:     General: Skin is warm and dry.      Capillary Refill: Capillary refill takes less than 2 seconds.   Neurological:      General: No focal deficit present.      Mental Status: She is alert and oriented to person, place, and time. Mental status is at baseline.   Psychiatric:         Mood and Affect: Mood normal.         Behavior: Behavior normal.         Thought Content: Thought content normal.         Judgment: Judgment normal.         Assessment/Plan:   Assessment    1. Exposure to strep throat  amoxicillin (AMOXIL) 500 MG Cap   2. Pharyngitis, unspecified etiology  amoxicillin (AMOXIL) 500 MG Cap     Although her strep test was negative, contingent antibiotic sent to pharmacy due to  positive exposure in household as well as symptoms.  She was educated to watch and wait symptoms for an additional 24 hours.  If symptoms worsen start antibiotic.  If she is improving and she was educated to refrain from use.  Supportive treatment discussed.  Follow-up in clinic for worsening or persistent symptoms.  AVS handout given and reviewed with patient. Pt educated on red flags and when to seek treatment back in ER or UC.

## 2022-03-21 ENCOUNTER — HOSPITAL ENCOUNTER (OUTPATIENT)
Facility: MEDICAL CENTER | Age: 37
End: 2022-03-21
Attending: SPECIALIST
Payer: MEDICAID

## 2022-03-21 PROCEDURE — 88175 CYTOPATH C/V AUTO FLUID REDO: CPT

## 2022-03-21 PROCEDURE — 87591 N.GONORRHOEAE DNA AMP PROB: CPT

## 2022-03-21 PROCEDURE — 87491 CHLMYD TRACH DNA AMP PROBE: CPT

## 2022-03-21 PROCEDURE — 87624 HPV HI-RISK TYP POOLED RSLT: CPT

## 2022-04-10 ENCOUNTER — OFFICE VISIT (OUTPATIENT)
Dept: URGENT CARE | Facility: CLINIC | Age: 37
End: 2022-04-10
Payer: MEDICAID

## 2022-04-10 VITALS
HEART RATE: 73 BPM | OXYGEN SATURATION: 98 % | BODY MASS INDEX: 33.64 KG/M2 | RESPIRATION RATE: 12 BRPM | TEMPERATURE: 97.3 F | HEIGHT: 70 IN | DIASTOLIC BLOOD PRESSURE: 72 MMHG | WEIGHT: 235 LBS | SYSTOLIC BLOOD PRESSURE: 118 MMHG

## 2022-04-10 DIAGNOSIS — J02.9 SORE THROAT: ICD-10-CM

## 2022-04-10 LAB
INT CON NEG: NEGATIVE
INT CON POS: POSITIVE
S PYO AG THROAT QL: NEGATIVE

## 2022-04-10 PROCEDURE — 87880 STREP A ASSAY W/OPTIC: CPT | Performed by: PHYSICIAN ASSISTANT

## 2022-04-10 PROCEDURE — 99213 OFFICE O/P EST LOW 20 MIN: CPT | Performed by: PHYSICIAN ASSISTANT

## 2022-04-10 NOTE — PROGRESS NOTES
Subjective     Shakira Pete is a 37 y.o. female who presents with Pharyngitis (X 2 days Requesting strep test )    Medications:    • This patient does not have an active medication from one of the medication groupers.    Allergies: Patient has no known allergies.    Problem List: Shakira Pete does not have any pertinent problems on file.    Surgical History:  Past Surgical History:   Procedure Laterality Date   • DENTAL EXTRACTION(S)         Past Social Hx: Shakira Pete  reports that she has never smoked. She has never used smokeless tobacco. She reports current alcohol use. She reports that she does not use drugs.     Past Family Hx:  Shakira Pete family history includes Depression in her mother, sister, and sister; No Known Problems in her daughter; Other in her daughter; Pulmonary Embolism in her paternal grandmother.     Problem list, medications, and allergies reviewed by myself today in Epic.          Patient presents with:  Pharyngitis: X 2 days Requesting strep test.  Pt denies fever, chills, n/v/d or rash.         Pharyngitis   This is a new problem. The current episode started yesterday. The problem has been gradually worsening. Neither side of throat is experiencing more pain than the other. There has been no fever. The pain is at a severity of 5/10. The pain is moderate. Associated symptoms include swollen glands. Pertinent negatives include no congestion, coughing, diarrhea, ear pain, plugged ear sensation, stridor, trouble swallowing or vomiting. She has had exposure to strep. She has tried cool liquids, gargles and NSAIDs for the symptoms. The treatment provided no relief.       Review of Systems   Constitutional: Negative for chills and fever.   HENT: Positive for sore throat. Negative for congestion, ear pain and trouble swallowing.    Respiratory: Negative for cough and stridor.    Gastrointestinal: Negative for diarrhea, nausea and vomiting.   Skin: Negative for rash.   All  "other systems reviewed and are negative.             Objective     /72   Pulse 73   Temp 36.3 °C (97.3 °F)   Resp 12   Ht 1.778 m (5' 10\")   Wt 107 kg (235 lb)   SpO2 98%   BMI 33.72 kg/m²      Physical Exam  Vitals and nursing note reviewed.   Constitutional:       General: She is not in acute distress.     Appearance: Normal appearance. She is well-developed and normal weight. She is not ill-appearing or toxic-appearing.   HENT:      Head: Normocephalic and atraumatic.      Right Ear: Tympanic membrane normal.      Left Ear: Tympanic membrane normal.      Nose: Nose normal.      Mouth/Throat:      Lips: Pink.      Mouth: Mucous membranes are moist.      Pharynx: Uvula midline. Posterior oropharyngeal erythema present. No oropharyngeal exudate or uvula swelling.      Tonsils: No tonsillar exudate.   Eyes:      Extraocular Movements: Extraocular movements intact.      Conjunctiva/sclera: Conjunctivae normal.      Pupils: Pupils are equal, round, and reactive to light.   Cardiovascular:      Rate and Rhythm: Normal rate and regular rhythm.      Heart sounds: Normal heart sounds.   Pulmonary:      Effort: Pulmonary effort is normal.      Breath sounds: Normal breath sounds.   Abdominal:      Palpations: Abdomen is soft.   Musculoskeletal:         General: Normal range of motion.      Cervical back: Normal range of motion and neck supple.   Lymphadenopathy:      Cervical: No cervical adenopathy.   Skin:     General: Skin is warm and dry.      Capillary Refill: Capillary refill takes less than 2 seconds.   Neurological:      General: No focal deficit present.      Mental Status: She is alert and oriented to person, place, and time.      Gait: Gait normal.   Psychiatric:         Mood and Affect: Mood normal.         Behavior: Behavior is cooperative.              Lab Results/POC Test Results   Results for orders placed or performed in visit on 04/10/22   POCT Rapid Strep A   Result Value Ref Range    Rapid " Strep Screen Negative     Internal Control Positive Positive     Internal Control Negative Negative                   Assessment & Plan              1. Sore throat  POCT Rapid Strep A     Patient was evaluated in clinic today while wearing appropriate personal protective equipment.      Discussed that I felt this was viral in nature. Did not see any evidence of a bacterial process. Discussed natural progression and sx care.    PT advised saltwater gargles/swishes  3-4 times daily until symptoms improve.     Motrin/Advil/Ibuprophen 600 mg every 6 hours as needed for pain or fever.    PT should follow up with PCP in 1-2 days for re-evaluation if symptoms have not improved.      Discussed red flags and reasons to return to UC or ED.      Pt and/or family verbalized understanding of diagnosis and follow up instructions and was offered informational handout on diagnosis.  PT discharged.

## 2022-04-12 PROBLEM — R33.9 INCOMPLETE EMPTYING OF BLADDER: Status: ACTIVE | Noted: 2022-03-15

## 2022-04-12 ASSESSMENT — ENCOUNTER SYMPTOMS
SORE THROAT: 1
TROUBLE SWALLOWING: 0
COUGH: 0
SWOLLEN GLANDS: 1
CHILLS: 0
STRIDOR: 0
DIARRHEA: 0
NAUSEA: 0
FEVER: 0
VOMITING: 0

## 2022-04-25 ENCOUNTER — OFFICE VISIT (OUTPATIENT)
Dept: URGENT CARE | Facility: CLINIC | Age: 37
End: 2022-04-25
Payer: MEDICAID

## 2022-04-25 ENCOUNTER — HOSPITAL ENCOUNTER (OUTPATIENT)
Facility: MEDICAL CENTER | Age: 37
End: 2022-04-25
Attending: PHYSICIAN ASSISTANT
Payer: MEDICAID

## 2022-04-25 VITALS
RESPIRATION RATE: 16 BRPM | TEMPERATURE: 97.8 F | WEIGHT: 230 LBS | DIASTOLIC BLOOD PRESSURE: 80 MMHG | BODY MASS INDEX: 32.93 KG/M2 | HEART RATE: 68 BPM | OXYGEN SATURATION: 96 % | HEIGHT: 70 IN | SYSTOLIC BLOOD PRESSURE: 120 MMHG

## 2022-04-25 DIAGNOSIS — J02.9 PHARYNGITIS, UNSPECIFIED ETIOLOGY: ICD-10-CM

## 2022-04-25 DIAGNOSIS — J04.0 LARYNGITIS: ICD-10-CM

## 2022-04-25 LAB
INT CON NEG: NEGATIVE
INT CON POS: POSITIVE
S PYO AG THROAT QL: NEGATIVE

## 2022-04-25 PROCEDURE — 99214 OFFICE O/P EST MOD 30 MIN: CPT | Performed by: PHYSICIAN ASSISTANT

## 2022-04-25 PROCEDURE — 87880 STREP A ASSAY W/OPTIC: CPT | Performed by: PHYSICIAN ASSISTANT

## 2022-04-25 PROCEDURE — 87070 CULTURE OTHR SPECIMN AEROBIC: CPT

## 2022-04-25 RX ORDER — FLUTICASONE PROPIONATE 50 MCG
1 SPRAY, SUSPENSION (ML) NASAL DAILY
Qty: 16 G | Refills: 0 | Status: SHIPPED | OUTPATIENT
Start: 2022-04-25

## 2022-04-25 ASSESSMENT — ENCOUNTER SYMPTOMS
SORE THROAT: 1
COUGH: 0
CHILLS: 0
FEVER: 0

## 2022-04-25 NOTE — PROGRESS NOTES
"Subjective:   Shakira Pete is a 37 y.o. female who presents for Pharyngitis (Started Saturday, congestion, loss of voice)        Presents for evaluation of sore throat and hoarse voice that began on Saturday.  Symptoms have been waxing and waning.  Symptoms are worse during the night and in the morning.  Symptoms seem to improve throughout the day.  She endorses mild nasal congestion.  She is unsure if she has postnasal drip.  States that she did cough up some phlegm earlier today.  She gargled with mouth wash without symptomatic relief.  She is not having any pain with swallowing, nausea, vomiting, fevers, chills, headaches, body aches, malaise, fatigue.  States that she has history of strep throat and is concerned this might be the cause of her symptoms.  Last sore throat was similar in approximately 2 weeks ago.  Per patient strep testing at this visit was negative.    Review of Systems   Constitutional: Negative for chills and fever.   HENT: Positive for congestion and sore throat. Negative for ear pain.    Respiratory: Negative for cough.        PMH:  has a past medical history of ADHD, Anxiety, Depression, and Migraine.  MEDS:   Current Outpatient Medications:   •  fluticasone (FLONASE) 50 MCG/ACT nasal spray, Administer 1 Spray into affected nostril(S) every day., Disp: 16 g, Rfl: 0  ALLERGIES: No Known Allergies  SURGHX:   Past Surgical History:   Procedure Laterality Date   • DENTAL EXTRACTION(S)       SOCHX:  reports that she has never smoked. She has never used smokeless tobacco. She reports current alcohol use. She reports that she does not use drugs.  FH: Family history was reviewed, no pertinent findings to report   Objective:   /80   Pulse 68   Temp 36.6 °C (97.8 °F) (Temporal)   Resp 16   Ht 1.778 m (5' 10\")   Wt 104 kg (230 lb)   SpO2 96%   BMI 33.00 kg/m²   Physical Exam  Vitals reviewed.   Constitutional:       General: She is not in acute distress.     Appearance: Normal " appearance. She is well-developed. She is not toxic-appearing.   HENT:      Head: Normocephalic and atraumatic.      Right Ear: External ear normal.      Left Ear: External ear normal.      Nose: Congestion (Mild) present. No rhinorrhea.      Comments: Hoarse voice     Mouth/Throat:      Lips: Pink.      Mouth: Mucous membranes are moist.      Pharynx: Oropharynx is clear. Uvula midline. Posterior oropharyngeal erythema (Mild) present.   Cardiovascular:      Rate and Rhythm: Normal rate and regular rhythm.      Heart sounds: Normal heart sounds, S1 normal and S2 normal.   Pulmonary:      Effort: Pulmonary effort is normal. No respiratory distress.      Breath sounds: Normal breath sounds. No stridor. No decreased breath sounds, wheezing, rhonchi or rales.   Lymphadenopathy:      Comments: No cervical lymphadenopathy.   Skin:     General: Skin is dry.   Neurological:      Comments: Alert and oriented.    Psychiatric:         Speech: Speech normal.         Behavior: Behavior normal.           Assessment/Plan:   1. Laryngitis  - fluticasone (FLONASE) 50 MCG/ACT nasal spray; Administer 1 Spray into affected nostril(S) every day.  Dispense: 16 g; Refill: 0    2. Pharyngitis, unspecified etiology  - POCT Rapid Strep A  - fluticasone (FLONASE) 50 MCG/ACT nasal spray; Administer 1 Spray into affected nostril(S) every day.  Dispense: 16 g; Refill: 0  - CULTURE THROAT; Future    POC strep testing is negative.  I am not convinced that this is bacterial but do to recurrence of sore throat I will obtain a throat culture to definitively rule out.  I suspect that discomfort and laryngitis may be secondary to postnasal drip.  I would like patient to do salt water gargles regularly and drink hot tea with honey.  Lozenges as needed.  We will start her on Flonase daily.  I expect symptoms to improve in the next 3 to 5 days.  If new symptoms develop, symptoms worsen or symptoms fail to fully resolve see PCP for reevaluation.  I will  contact patient with culture results and adjust treatment plan as indicated.    Differential diagnosis, natural history, supportive care, and indications for immediate follow-up discussed.

## 2022-04-26 DIAGNOSIS — J02.9 PHARYNGITIS, UNSPECIFIED ETIOLOGY: ICD-10-CM

## 2022-04-28 LAB
BACTERIA SPEC RESP CULT: NORMAL
SIGNIFICANT IND 70042: NORMAL
SITE SITE: NORMAL
SOURCE SOURCE: NORMAL

## 2022-04-30 NOTE — RESULT ENCOUNTER NOTE
Hi Shakira,    Your throat culture was negative.  I hope that you are feeling better!    Kind regards,  ALVINO